# Patient Record
Sex: MALE | Race: WHITE | NOT HISPANIC OR LATINO | Employment: OTHER | URBAN - METROPOLITAN AREA
[De-identification: names, ages, dates, MRNs, and addresses within clinical notes are randomized per-mention and may not be internally consistent; named-entity substitution may affect disease eponyms.]

---

## 2023-03-23 LAB
ALANINE AMINOTRANSFERASE (SGPT) (U/L) IN SER/PLAS: 26 U/L (ref 10–52)
ALBUMIN (G/DL) IN SER/PLAS: 4.5 G/DL (ref 3.4–5)
ALKALINE PHOSPHATASE (U/L) IN SER/PLAS: 68 U/L (ref 33–136)
ANION GAP IN SER/PLAS: 12 MMOL/L (ref 10–20)
ASPARTATE AMINOTRANSFERASE (SGOT) (U/L) IN SER/PLAS: 22 U/L (ref 9–39)
BILIRUBIN TOTAL (MG/DL) IN SER/PLAS: 0.6 MG/DL (ref 0–1.2)
CALCIUM (MG/DL) IN SER/PLAS: 9.9 MG/DL (ref 8.6–10.6)
CARBON DIOXIDE, TOTAL (MMOL/L) IN SER/PLAS: 31 MMOL/L (ref 21–32)
CHLORIDE (MMOL/L) IN SER/PLAS: 106 MMOL/L (ref 98–107)
CHOLESTEROL (MG/DL) IN SER/PLAS: 132 MG/DL (ref 0–199)
CHOLESTEROL IN HDL (MG/DL) IN SER/PLAS: 61.5 MG/DL
CHOLESTEROL/HDL RATIO: 2.1
CREATININE (MG/DL) IN SER/PLAS: 0.94 MG/DL (ref 0.5–1.3)
ERYTHROCYTE DISTRIBUTION WIDTH (RATIO) BY AUTOMATED COUNT: 12.5 % (ref 11.5–14.5)
ERYTHROCYTE MEAN CORPUSCULAR HEMOGLOBIN CONCENTRATION (G/DL) BY AUTOMATED: 32.7 G/DL (ref 32–36)
ERYTHROCYTE MEAN CORPUSCULAR VOLUME (FL) BY AUTOMATED COUNT: 100 FL (ref 80–100)
ERYTHROCYTES (10*6/UL) IN BLOOD BY AUTOMATED COUNT: 4.61 X10E12/L (ref 4.5–5.9)
GFR MALE: 84 ML/MIN/1.73M2
GLUCOSE (MG/DL) IN SER/PLAS: 93 MG/DL (ref 74–99)
HEMATOCRIT (%) IN BLOOD BY AUTOMATED COUNT: 46.2 % (ref 41–52)
HEMOGLOBIN (G/DL) IN BLOOD: 15.1 G/DL (ref 13.5–17.5)
LDL: 56 MG/DL (ref 0–99)
LEUKOCYTES (10*3/UL) IN BLOOD BY AUTOMATED COUNT: 4.7 X10E9/L (ref 4.4–11.3)
NRBC (PER 100 WBCS) BY AUTOMATED COUNT: 0 /100 WBC (ref 0–0)
PLATELETS (10*3/UL) IN BLOOD AUTOMATED COUNT: 232 X10E9/L (ref 150–450)
POTASSIUM (MMOL/L) IN SER/PLAS: 4.6 MMOL/L (ref 3.5–5.3)
PROSTATE SPECIFIC AG (NG/ML) IN SER/PLAS: <0.1 NG/ML (ref 0–4)
PROTEIN TOTAL: 7 G/DL (ref 6.4–8.2)
SODIUM (MMOL/L) IN SER/PLAS: 144 MMOL/L (ref 136–145)
THYROTROPIN (MIU/L) IN SER/PLAS BY DETECTION LIMIT <= 0.05 MIU/L: 1.03 MIU/L (ref 0.44–3.98)
TRIGLYCERIDE (MG/DL) IN SER/PLAS: 72 MG/DL (ref 0–149)
UREA NITROGEN (MG/DL) IN SER/PLAS: 17 MG/DL (ref 6–23)
VLDL: 14 MG/DL (ref 0–40)

## 2023-06-12 ENCOUNTER — HOSPITAL ENCOUNTER (OUTPATIENT)
Dept: DATA CONVERSION | Facility: HOSPITAL | Age: 78
End: 2023-06-12
Attending: INTERNAL MEDICINE
Payer: MEDICARE

## 2023-06-12 DIAGNOSIS — Z79.82 LONG TERM (CURRENT) USE OF ASPIRIN: ICD-10-CM

## 2023-06-12 DIAGNOSIS — R13.10 DYSPHAGIA, UNSPECIFIED: ICD-10-CM

## 2023-06-12 DIAGNOSIS — K21.9 GASTRO-ESOPHAGEAL REFLUX DISEASE WITHOUT ESOPHAGITIS: ICD-10-CM

## 2023-06-12 DIAGNOSIS — K22.89 OTHER SPECIFIED DISEASE OF ESOPHAGUS: ICD-10-CM

## 2023-06-12 DIAGNOSIS — I45.10 UNSPECIFIED RIGHT BUNDLE-BRANCH BLOCK: ICD-10-CM

## 2023-06-12 DIAGNOSIS — I25.10 ATHEROSCLEROTIC HEART DISEASE OF NATIVE CORONARY ARTERY WITHOUT ANGINA PECTORIS: ICD-10-CM

## 2023-06-12 DIAGNOSIS — I47.10 SUPRAVENTRICULAR TACHYCARDIA (CMS-HCC): ICD-10-CM

## 2023-06-12 DIAGNOSIS — K44.9 DIAPHRAGMATIC HERNIA WITHOUT OBSTRUCTION OR GANGRENE: ICD-10-CM

## 2023-06-12 DIAGNOSIS — Z85.46 PERSONAL HISTORY OF MALIGNANT NEOPLASM OF PROSTATE: ICD-10-CM

## 2023-06-12 DIAGNOSIS — K22.2 ESOPHAGEAL OBSTRUCTION: ICD-10-CM

## 2023-06-12 DIAGNOSIS — Z95.1 PRESENCE OF AORTOCORONARY BYPASS GRAFT: ICD-10-CM

## 2023-06-12 DIAGNOSIS — I10 ESSENTIAL (PRIMARY) HYPERTENSION: ICD-10-CM

## 2023-06-16 ENCOUNTER — HOSPITAL ENCOUNTER (OUTPATIENT)
Dept: DATA CONVERSION | Facility: HOSPITAL | Age: 78
End: 2023-06-16
Attending: PHYSICAL MEDICINE & REHABILITATION
Payer: MEDICARE

## 2023-06-16 DIAGNOSIS — M48.062 SPINAL STENOSIS, LUMBAR REGION WITH NEUROGENIC CLAUDICATION: ICD-10-CM

## 2023-09-07 VITALS
HEART RATE: 92 BPM | TEMPERATURE: 98.2 F | RESPIRATION RATE: 16 BRPM | DIASTOLIC BLOOD PRESSURE: 72 MMHG | SYSTOLIC BLOOD PRESSURE: 145 MMHG

## 2023-09-20 ENCOUNTER — HOSPITAL ENCOUNTER (OUTPATIENT)
Dept: DATA CONVERSION | Facility: HOSPITAL | Age: 78
End: 2023-09-20
Attending: PHYSICAL MEDICINE & REHABILITATION | Admitting: PHYSICAL MEDICINE & REHABILITATION
Payer: MEDICARE

## 2023-09-20 DIAGNOSIS — M48.062 SPINAL STENOSIS, LUMBAR REGION WITH NEUROGENIC CLAUDICATION: ICD-10-CM

## 2023-09-29 VITALS
RESPIRATION RATE: 16 BRPM | SYSTOLIC BLOOD PRESSURE: 165 MMHG | DIASTOLIC BLOOD PRESSURE: 79 MMHG | HEART RATE: 80 BPM | TEMPERATURE: 97.5 F

## 2023-09-30 NOTE — H&P
History of Present Illness:   History Present Illness:  Reason for surgery: Lumbar stenosis with neurogenic  claudication   HPI:    Patient with lumbar stenosis with neurogenic claudication here for repeat L5-S1 ammeter RALEIGH.    Allergies:        Allergies:  ·  beta blockers : Anaphylaxis    Home Medication Review:   Home Medications Reviewed: yes     Impression/Procedure:   ·  Impression and Planned Procedure: Stenosis with neurogenic [id number] interlaminar RALEIGH       ERAS (Enhanced Recovery After Surgery):  ·  ERAS Patient: no       Vital Signs:  Temperature C: 36.4 degrees C   Temperature F: 97.5 degrees F   Heart Rate: 80 beats per minute   Respiratory Rate: 16 breath per minute   Blood Pressure Systolic: 165 mm/Hg   Blood Pressure Diastolic: 79 mm/Hg     Physical Exam by System:    Constitutional: Well developed, awake/alert/oriented  x3, no distress, alert and cooperative   Eyes: PERRL, EOMI, clear sclera   ENMT: mucous membranes moist, no apparent injury,  no lesions seen   Head/Neck: Neck supple, no apparent injury, thyroid  without mass or tenderness, No JVD, trachea midline, no bruits   Respiratory/Thorax: unlabored breathing   Cardiovascular: no edema   Skin: Warm and dry, no lesions, no rashes     Consent:   COVID-19 Consent:  ·  COVID-19 Risk Consent Surgeon has reviewed key risks related to the risk of jorge COVID-19 and if they contract COVID-19 what the risks are.       Electronic Signatures:  Darryl Neely)  (Signed 20-Sep-2023 09:50)   Authored: History of Present Illness, Allergies, Home  Medication Review, Impression/Procedure, ERAS, Physical Exam, Consent, Note Completion      Last Updated: 20-Sep-2023 09:50 by Darryl Neely)

## 2023-09-30 NOTE — H&P
History of Present Illness:   History Present Illness:  Reason for surgery: lumbar stenosis   HPI:    lumbar stenosis    Allergies:        Allergies:  ·  beta blockers : Anaphylaxis    Home Medication Review:   Home Medications Reviewed: yes     Impression/Procedure:   ·  Impression and Planned Procedure: lumbar stenosis -> l5/s1 interlaminar epidural steroid injection       ERAS (Enhanced Recovery After Surgery):  ·  ERAS Patient: no       Vital Signs:  Temperature C: 36.8 degrees C   Temperature F: 98.2 degrees F   Heart Rate: 92 beats per minute   Respiratory Rate: 16 breath per minute   Blood Pressure Systolic: 145 mm/Hg   Blood Pressure Diastolic: 72 mm/Hg     Physical Exam by System:    Respiratory/Thorax: Nonlabored breathing, symmetric  chest rise   Cardiovascular: Pulse regular     Consent:   COVID-19 Consent:  ·  COVID-19 Risk Consent Surgeon has reviewed key risks related to the risk of jorge COVID-19 and if they contract COVID-19 what the risks are.     Attestation:   Note Completion:  I am a:  Resident/Fellow   Attending Attestation I saw and evaluated the patient.  I personally obtained the key and critical portions of the history and physical exam or was physically present for key and  critical portions performed by the resident/fellow. I reviewed the resident/fellow?s documentation and discussed the patient with the resident/fellow.  I agree with the resident/fellow?s medical decision making as documented in the note.     I personally evaluated the patient on 16-Jun-2023         Electronic Signatures:  Roberto Garcia (Resident))  (Signed 16-Jun-2023 13:38)   Authored: History of Present Illness, Allergies, Home  Medication Review, Impression/Procedure, ERAS, Physical Exam, Consent, Note Completion  Darryl Neely)  (Signed 16-Jun-2023 13:55)   Authored: Note Completion   Co-Signer: History of Present Illness, Allergies, Home Medication Review, Impression/Procedure, ERAS, Physical  Exam, Consent, Note Completion      Last Updated: 16-Jun-2023 13:55 by Darryl Neely)

## 2023-10-01 NOTE — OP NOTE
Post Operative Note:     PreOp Diagnosis: Lumbar stenosis with neurogenic  claudication   Post-Procedure Diagnosis: Lumbar stenosis with neurogenic  claudication   Procedure: 1.  L5-S1 interlaminar  2.   3.   4.   5.   Surgeon: Darryl Neely MD   Resident/Fellow/Other Assistant: Urbano Davis DO   Estimated Blood Loss (mL): none   Specimen: no   Complications: none apparent   Findings: expected anatomy     Operative Report Dictated:  Dictation: not applicable - note contains Operative  Report   Operative Report:    After informed consent was obtained, the patient was brought to the operating room and placed in the prone position.  The back area was prepped and draped in usual  sterile fashion.  Using fluoroscopic guidance, the skin and subcutaneous tissue overlying needle trajectory to the below interlaminar epidural space were anesthetized with a total of 5 mL of 0.5% lidocaine in the below paraspinous approach.  An 18-gauge  Tuohy needle was then advanced under fluoroscopic guidance with the below paraspinous approach into the below  interlaminar epidural space. Entry of the epidural space was confirmed using loss of resistance technique with a glass syringe and 2 mL of air.   Injection of Iohexol contrast revealed appropriate spread without vascular uptake. Subsequently, the below medications were injected.  The needle was removed.  The patient tolerated the procedure well.  The patient was then transferred to recovery room  in stable condition. The patient will participate in physical therapy, update us on his/her response, follow up with us on outpatient basis as needed.    Level: L5-S1    Medications [4 mL of 0.5% lidocaine and 40 mg methylprednisolone]    Attestation:   Note Completion:  Attending Attestation I was present for the entire procedure         Electronic Signatures:  Darryl Neely (MD)  (Signed 20-Sep-2023 10:39)   Authored: Post Operative Note, Note Completion      Last Updated:  20-Sep-2023 10:39 by Darryl Neely)

## 2023-10-02 NOTE — OP NOTE
Post Operative Note:     PreOp Diagnosis: Lumbar stenosis with neurogenic  claudication   Post-Procedure Diagnosis: Lumbar stenosis with neurogenic  claudication   Procedure: 1.  L5-S1 interlaminar RALEIGH  2.   3.   4.   5.   Surgeon: Darryl Neely MD   Resident/Fellow/Other Assistant: Roberto Garcia DO   Estimated Blood Loss (mL): none   Specimen: no   Complications: none apparent   Findings: expected anatomy     Operative Report Dictated:  Dictation: not applicable - note contains Operative  Report   Operative Report:    After informed consent was obtained, the patient was brought to the operating room and placed in the prone position.  The back area was prepped and draped in usual  sterile fashion.  Using fluoroscopic guidance, the skin and subcutaneous tissue overlying needle trajectory to the below interlaminar epidural space were anesthetized with a total of 5 mL of 0.5% lidocaine in the below paraspinous approach.  An 18-gauge  Tuohy needle was then advanced under fluoroscopic guidance with the below paraspinous approach into the below  interlaminar epidural space. Entry of the epidural space was confirmed using loss of resistance technique with a glass syringe and 2 mL of air.   Injection of Iohexol contrast revealed appropriate spread without vascular uptake. Subsequently, the below medications were injected.  The needle was removed.  The patient tolerated the procedure well.  The patient was then transferred to recovery room  in stable condition. The patient will participate in physical therapy, update us on his/her response, follow up with us on outpatient basis as needed.    Level: L5-S1    Medications [4 mL of 0.5% lidocaine and 40 mg methylprednisolone]    Attestation:   Note Completion:  Attending Attestation I was present for the entire procedure         Electronic Signatures:  Darryl Neely (MD)  (Signed 16-Jun-2023 14:38)   Authored: Post Operative Note, Note Completion      Last Updated:  16-Jun-2023 14:38 by Darryl Neely)

## 2023-10-09 PROBLEM — I49.9 ARRHYTHMIA: Status: ACTIVE | Noted: 2023-10-09

## 2023-10-09 PROBLEM — M48.061 DEGENERATIVE LUMBAR SPINAL STENOSIS: Status: ACTIVE | Noted: 2023-10-09

## 2023-10-09 PROBLEM — K21.9 HIATAL HERNIA WITH GERD: Status: ACTIVE | Noted: 2023-10-09

## 2023-10-09 PROBLEM — D22.39 MELANOCYTIC NEVI OF OTHER PARTS OF FACE: Status: ACTIVE | Noted: 2019-06-05

## 2023-10-09 PROBLEM — D22.5 MELANOCYTIC NEVI OF TRUNK: Status: ACTIVE | Noted: 2019-06-05

## 2023-10-09 PROBLEM — L90.5 SCAR CONDITION AND FIBROSIS OF SKIN: Status: ACTIVE | Noted: 2019-06-05

## 2023-10-09 PROBLEM — E55.9 VITAMIN D DEFICIENCY, UNSPECIFIED: Status: ACTIVE | Noted: 2023-10-09

## 2023-10-09 PROBLEM — I25.10 CORONARY ARTERY DISEASE: Status: ACTIVE | Noted: 2023-10-09

## 2023-10-09 PROBLEM — M54.10 RADICULAR SYNDROME OF RIGHT LEG: Status: ACTIVE | Noted: 2023-10-09

## 2023-10-09 PROBLEM — E78.5 HYPERLIPIDEMIA: Status: ACTIVE | Noted: 2023-10-09

## 2023-10-09 PROBLEM — L57.9 SKIN CHANGES DUE TO CHRONIC EXPOSURE TO NONIONIZING RADIATION, UNSPECIFIED: Status: ACTIVE | Noted: 2019-06-05

## 2023-10-09 PROBLEM — I45.10 RIGHT BUNDLE BRANCH BLOCK (RBBB) ON ELECTROCARDIOGRAPHY: Status: ACTIVE | Noted: 2023-10-09

## 2023-10-09 PROBLEM — N52.9 MALE ERECTILE DISORDER: Status: ACTIVE | Noted: 2023-10-09

## 2023-10-09 PROBLEM — F32.A ANXIETY AND DEPRESSION: Status: ACTIVE | Noted: 2023-10-09

## 2023-10-09 PROBLEM — D48.5 NEOPLASM OF UNCERTAIN BEHAVIOR OF SKIN: Status: ACTIVE | Noted: 2019-06-05

## 2023-10-09 PROBLEM — M54.50 LOW BACK PAIN: Status: ACTIVE | Noted: 2023-10-09

## 2023-10-09 PROBLEM — L82.1 OTHER SEBORRHEIC KERATOSIS: Status: ACTIVE | Noted: 2019-06-05

## 2023-10-09 PROBLEM — D18.01 HEMANGIOMA OF SKIN AND SUBCUTANEOUS TISSUE: Status: ACTIVE | Noted: 2019-06-05

## 2023-10-09 PROBLEM — K44.9 HIATAL HERNIA WITH GERD: Status: ACTIVE | Noted: 2023-10-09

## 2023-10-09 PROBLEM — L81.4 OTHER MELANIN HYPERPIGMENTATION: Status: ACTIVE | Noted: 2019-06-05

## 2023-10-09 PROBLEM — R94.31 ABNORMAL ELECTROCARDIOGRAM: Status: ACTIVE | Noted: 2023-10-09

## 2023-10-09 PROBLEM — J30.9 ALLERGIC RHINITIS: Status: ACTIVE | Noted: 2023-10-09

## 2023-10-09 PROBLEM — D22.9 MULTIPLE NEVI: Status: ACTIVE | Noted: 2023-10-09

## 2023-10-09 PROBLEM — F41.9 ANXIETY AND DEPRESSION: Status: ACTIVE | Noted: 2023-10-09

## 2023-10-09 PROBLEM — L57.0 ACTINIC KERATOSIS: Status: ACTIVE | Noted: 2019-06-05

## 2023-10-09 PROBLEM — K22.2 SCHATZKI'S RING: Status: ACTIVE | Noted: 2023-10-09

## 2023-10-09 PROBLEM — C61 PROSTATE CANCER (MULTI): Status: ACTIVE | Noted: 2023-10-09

## 2023-10-09 PROBLEM — D22.70 MELANOCYTIC NEVI OF UNSPECIFIED LOWER LIMB, INCLUDING HIP: Status: ACTIVE | Noted: 2019-06-05

## 2023-10-09 PROBLEM — D75.89 MACROCYTOSIS WITHOUT ANEMIA: Status: ACTIVE | Noted: 2023-10-09

## 2023-10-09 PROBLEM — I10 HYPERTENSION: Status: ACTIVE | Noted: 2023-10-09

## 2023-10-09 PROBLEM — D22.60 MELANOCYTIC NEVI OF UNSPECIFIED UPPER LIMB, INCLUDING SHOULDER: Status: ACTIVE | Noted: 2019-06-05

## 2023-10-09 RX ORDER — HYDROGEN PEROXIDE 3 %
20 SOLUTION, NON-ORAL MISCELLANEOUS
COMMUNITY
Start: 2023-08-16 | End: 2024-05-20 | Stop reason: SDUPTHER

## 2023-10-09 RX ORDER — FUROSEMIDE 20 MG/1
10 TABLET ORAL DAILY
COMMUNITY
Start: 2023-08-02

## 2023-10-09 RX ORDER — FLUTICASONE PROPIONATE 50 MCG
1 SPRAY, SUSPENSION (ML) NASAL DAILY
COMMUNITY
Start: 2022-03-24 | End: 2024-03-21 | Stop reason: SDUPTHER

## 2023-10-09 RX ORDER — ATORVASTATIN CALCIUM 40 MG/1
1 TABLET, FILM COATED ORAL DAILY
COMMUNITY
Start: 2015-02-19 | End: 2024-05-20 | Stop reason: SDUPTHER

## 2023-10-09 RX ORDER — ALBUTEROL SULFATE 90 UG/1
2 AEROSOL, METERED RESPIRATORY (INHALATION) EVERY 4 HOURS PRN
COMMUNITY
Start: 2022-12-30

## 2023-10-09 RX ORDER — OLMESARTAN MEDOXOMIL 40 MG/1
40 TABLET ORAL DAILY
COMMUNITY
Start: 2020-01-30 | End: 2024-05-20 | Stop reason: SDUPTHER

## 2023-10-09 RX ORDER — EZETIMIBE 10 MG/1
1 TABLET ORAL DAILY
COMMUNITY
Start: 2022-04-05 | End: 2024-05-20 | Stop reason: SDUPTHER

## 2023-10-09 RX ORDER — NIRMATRELVIR AND RITONAVIR 300-100 MG
KIT ORAL
COMMUNITY
Start: 2022-11-23 | End: 2023-10-11 | Stop reason: ALTCHOICE

## 2023-10-09 RX ORDER — DILTIAZEM HYDROCHLORIDE 300 MG/1
300 CAPSULE, COATED, EXTENDED RELEASE ORAL DAILY
COMMUNITY
Start: 2023-01-11 | End: 2024-05-20 | Stop reason: SDUPTHER

## 2023-10-09 RX ORDER — FLUTICASONE PROPIONATE AND SALMETEROL 250; 50 UG/1; UG/1
POWDER RESPIRATORY (INHALATION)
COMMUNITY
Start: 2023-03-28

## 2023-10-11 ENCOUNTER — OFFICE VISIT (OUTPATIENT)
Dept: CARDIOLOGY | Facility: CLINIC | Age: 78
End: 2023-10-11
Payer: MEDICARE

## 2023-10-11 VITALS
HEIGHT: 70 IN | HEART RATE: 84 BPM | WEIGHT: 192.19 LBS | OXYGEN SATURATION: 96 % | DIASTOLIC BLOOD PRESSURE: 74 MMHG | BODY MASS INDEX: 27.52 KG/M2 | SYSTOLIC BLOOD PRESSURE: 126 MMHG

## 2023-10-11 DIAGNOSIS — I10 HYPERTENSION, UNSPECIFIED TYPE: ICD-10-CM

## 2023-10-11 DIAGNOSIS — E78.5 HYPERLIPIDEMIA, UNSPECIFIED HYPERLIPIDEMIA TYPE: ICD-10-CM

## 2023-10-11 DIAGNOSIS — I25.10 CORONARY ARTERY DISEASE INVOLVING NATIVE CORONARY ARTERY OF NATIVE HEART WITHOUT ANGINA PECTORIS: Primary | ICD-10-CM

## 2023-10-11 PROCEDURE — 1126F AMNT PAIN NOTED NONE PRSNT: CPT | Performed by: NURSE PRACTITIONER

## 2023-10-11 PROCEDURE — 99214 OFFICE O/P EST MOD 30 MIN: CPT | Mod: PO | Performed by: NURSE PRACTITIONER

## 2023-10-11 PROCEDURE — 1159F MED LIST DOCD IN RCRD: CPT | Performed by: NURSE PRACTITIONER

## 2023-10-11 PROCEDURE — 3078F DIAST BP <80 MM HG: CPT | Performed by: NURSE PRACTITIONER

## 2023-10-11 PROCEDURE — 99214 OFFICE O/P EST MOD 30 MIN: CPT | Performed by: NURSE PRACTITIONER

## 2023-10-11 PROCEDURE — 1160F RVW MEDS BY RX/DR IN RCRD: CPT | Performed by: NURSE PRACTITIONER

## 2023-10-11 PROCEDURE — 1036F TOBACCO NON-USER: CPT | Performed by: NURSE PRACTITIONER

## 2023-10-11 PROCEDURE — 3074F SYST BP LT 130 MM HG: CPT | Performed by: NURSE PRACTITIONER

## 2023-10-11 RX ORDER — MULTIVITAMIN
1 TABLET ORAL
COMMUNITY
Start: 2012-09-06

## 2023-10-11 ASSESSMENT — ENCOUNTER SYMPTOMS
DEPRESSION: 0
LOSS OF SENSATION IN FEET: 0
OCCASIONAL FEELINGS OF UNSTEADINESS: 0

## 2023-10-11 ASSESSMENT — PAIN SCALES - GENERAL: PAINLEVEL: 0-NO PAIN

## 2023-10-11 NOTE — PROGRESS NOTES
Subjective   Neo Mckee is a 77 y.o. male.    Chief Complaint:  Routine 6 month follow up     Mr. Mckee returns for a 6 month follow up. He has been feeling well from a cardiac standpoint. He has remained compliant with his medications, denying any intolerances. He seems to be getting around reasonably well, denying any exertional chest pain or shortness of breath. His biggest limiting factor continues to be back pain. He denies any recent ER visits or hospitalizations. He offers no specific cardiovascular complaints or concerns today. He denies any complaints of chest pain, shortness of breath, lightheadedness, dizziness, palpitations, syncope, orthopnea, paroxysmal nocturnal dyspnea, lower extremity swelling or bleeding concerns.        Review of Systems   All other systems reviewed and are negative.      Objective   Physical Exam  Constitutional:       Appearance: Healthy appearance.   Pulmonary:      Effort: Pulmonary effort is normal.      Breath sounds: Normal breath sounds.   Cardiovascular:      Normal rate. Regular rhythm. Normal S1. Normal S2.       Murmurs: There is no murmur.   Edema:     Peripheral edema absent.   Abdominal:      Palpations: Abdomen is soft.   Musculoskeletal: Normal range of motion.      Cervical back: Normal range of motion. Skin:     General: Skin is warm and dry.   Neurological:      Mental Status: Alert and oriented to person, place and time.       Lab Review:   Lab Results   Component Value Date     03/23/2023    K 4.6 03/23/2023     03/23/2023    CO2 31 03/23/2023    BUN 17 03/23/2023    CREATININE 0.94 03/23/2023    GLUCOSE 93 03/23/2023    CALCIUM 9.9 03/23/2023     Lab Results   Component Value Date    WBC 4.7 03/23/2023    HGB 15.1 03/23/2023    HCT 46.2 03/23/2023     03/23/2023     03/23/2023     Lab Results   Component Value Date    CHOL 132 03/23/2023    TRIG 72 03/23/2023    HDL 61.5 03/23/2023       Assessment/Plan   Mr. Mckee is a pleasant  77 year old  male with a past medical history significant for hypertension, hyperlipidemia and coronary artery disease status post remote 2 vessel CABG in 1998. Stress-echo in 2018 showed no evidence of ischemia and preserved LV function. Echocardiogram 8/2021 showed an EF of 55-60% with mild MR. He presents today for routine follow up stable from a cardiac standpoint. His VS and EKG remain stable. He remains on appropriate antiplatelet and lipid lowering therapy with his LDL at goal. He struggles with back pain, though denies any chest pain or shortness of breath at his current activity level. I will have him continue all medications unchanged. He will follow up with us in clinic in 6 months. He knows to call for any concerns.

## 2023-10-11 NOTE — PATIENT INSTRUCTIONS
Follow up in 6 months     Call for any concerns    Consent: The risks of the medication were reviewed with the patient.

## 2023-11-06 RX ORDER — FLUTICASONE PROPIONATE 50 MCG
SPRAY, SUSPENSION (ML) NASAL
Qty: 32 G | Refills: 10 | OUTPATIENT
Start: 2023-11-06

## 2023-11-08 DIAGNOSIS — M48.062 SPINAL STENOSIS OF LUMBAR REGION WITH NEUROGENIC CLAUDICATION: Primary | ICD-10-CM

## 2024-01-03 ENCOUNTER — HOSPITAL ENCOUNTER (OUTPATIENT)
Dept: OPERATING ROOM | Facility: CLINIC | Age: 79
Setting detail: OUTPATIENT SURGERY
Discharge: HOME | End: 2024-01-03
Payer: MEDICARE

## 2024-01-03 ENCOUNTER — ANCILLARY PROCEDURE (OUTPATIENT)
Dept: RADIOLOGY | Facility: CLINIC | Age: 79
End: 2024-01-03
Payer: MEDICARE

## 2024-01-03 VITALS
HEIGHT: 70 IN | WEIGHT: 198.85 LBS | HEART RATE: 94 BPM | TEMPERATURE: 97.9 F | DIASTOLIC BLOOD PRESSURE: 99 MMHG | RESPIRATION RATE: 16 BRPM | OXYGEN SATURATION: 96 % | SYSTOLIC BLOOD PRESSURE: 173 MMHG | BODY MASS INDEX: 28.47 KG/M2

## 2024-01-03 DIAGNOSIS — M48.062 SPINAL STENOSIS OF LUMBAR REGION WITH NEUROGENIC CLAUDICATION: ICD-10-CM

## 2024-01-03 PROCEDURE — 77003 FLUOROGUIDE FOR SPINE INJECT: CPT | Mod: RSC

## 2024-01-03 PROCEDURE — 62323 NJX INTERLAMINAR LMBR/SAC: CPT | Performed by: PHYSICAL MEDICINE & REHABILITATION

## 2024-01-03 PROCEDURE — 2500000004 HC RX 250 GENERAL PHARMACY W/ HCPCS (ALT 636 FOR OP/ED): Performed by: PHYSICAL MEDICINE & REHABILITATION

## 2024-01-03 PROCEDURE — 2550000001 HC RX 255 CONTRASTS: Performed by: PHYSICAL MEDICINE & REHABILITATION

## 2024-01-03 PROCEDURE — 2500000005 HC RX 250 GENERAL PHARMACY W/O HCPCS: Performed by: PHYSICAL MEDICINE & REHABILITATION

## 2024-01-03 RX ORDER — METHYLPREDNISOLONE ACETATE 40 MG/ML
INJECTION, SUSPENSION INTRA-ARTICULAR; INTRALESIONAL; INTRAMUSCULAR; SOFT TISSUE AS NEEDED
Status: COMPLETED | OUTPATIENT
Start: 2024-01-03 | End: 2024-01-03

## 2024-01-03 RX ORDER — LIDOCAINE HYDROCHLORIDE 5 MG/ML
INJECTION, SOLUTION INFILTRATION; PERINEURAL AS NEEDED
Status: COMPLETED | OUTPATIENT
Start: 2024-01-03 | End: 2024-01-03

## 2024-01-03 RX ADMIN — IOHEXOL 240 MG: 240 INJECTION, SOLUTION INTRATHECAL; INTRAVASCULAR; INTRAVENOUS; ORAL at 11:13

## 2024-01-03 RX ADMIN — LIDOCAINE HYDROCHLORIDE 5 ML: 5 INJECTION, SOLUTION INFILTRATION; PERINEURAL at 11:12

## 2024-01-03 RX ADMIN — METHYLPREDNISOLONE ACETATE 40 MG: 40 INJECTION, SUSPENSION INTRA-ARTICULAR; INTRALESIONAL; INTRAMUSCULAR; SOFT TISSUE at 11:13

## 2024-01-03 ASSESSMENT — PAIN SCALES - GENERAL
PAINLEVEL_OUTOF10: 0 - NO PAIN
PAINLEVEL_OUTOF10: 1

## 2024-01-03 ASSESSMENT — COLUMBIA-SUICIDE SEVERITY RATING SCALE - C-SSRS
6. HAVE YOU EVER DONE ANYTHING, STARTED TO DO ANYTHING, OR PREPARED TO DO ANYTHING TO END YOUR LIFE?: NO
1. IN THE PAST MONTH, HAVE YOU WISHED YOU WERE DEAD OR WISHED YOU COULD GO TO SLEEP AND NOT WAKE UP?: NO
2. HAVE YOU ACTUALLY HAD ANY THOUGHTS OF KILLING YOURSELF?: NO

## 2024-01-03 ASSESSMENT — PAIN DESCRIPTION - DESCRIPTORS: DESCRIPTORS: ACHING

## 2024-01-03 ASSESSMENT — PAIN - FUNCTIONAL ASSESSMENT: PAIN_FUNCTIONAL_ASSESSMENT: 0-10

## 2024-01-03 NOTE — H&P
Patient ID: Patient with lumbar stenosis with neurogenic claudication who presents for the scheduled procedure.  No changes since last visit      Patient denies any recent antibiotic use or infections, denies any blood thinner use, and denies contrast or local anesthetic allergies           GENERAL EXAM  Vital Signs: Vital signs to include heart rate, respiration rate, blood pressure, and temperature were reviewed.  General Appearance:  Awake, alert, healthy appearing, well developed, No acute distress.  Head: Normocephalic without evidence of head injury.  Neck: The appearance of the neck was normal without swelling with a midline trachea.  Eyes: The eyelids and eyebrows exhibited no abnormalities.  Pupils were not pin-point.  Sclera was without icterus.  Lungs: Respiration rhythm and depth was normal.  Respiratory movements were normal without labored breathing.  Cardiovascular: No peripheral edema was present.    Neurological: Patient was oriented to time, place, and person.  Speech was normal.  Balance, gait, and stance were unremarkable.    Psychiatric: Appearance was normal with appropriate dress.  Mood was euthymic and affect was normal.  Skin: Affected regions were without ecchymosis or skin lesions.        Physical exam as above except:        Assessment/Plan    Patient with lumbar stenosis with neurogenic claudication here for L5-S1 interlaminar RALEIGH

## 2024-01-03 NOTE — PROCEDURES
General    Date/Time: 1/3/2024 11:13 AM    Performed by: Darryl Neely MD  Authorized by: Darryl Neely MD    Consent:     Consent obtained:  Written    Consent given by:  Patient    Risks, benefits, and alternatives were discussed: yes      Risks discussed:  Bleeding, infection, pain and nerve damage    Alternatives discussed:  No treatment, delayed treatment and alternative treatment  Universal protocol:     Procedure explained and questions answered to patient or proxy's satisfaction: yes      Relevant documents present and verified: yes      Test results available: yes      Imaging studies available: yes      Required blood products, implants, devices, and special equipment available: yes      Site/side marked: yes      Immediately prior to procedure, a time out was called: yes      Patient identity confirmed:  Verbally with patient, hospital-assigned identification number and arm band  Procedure specific details:      Lumbar interlaminar RALEIGH     After informed consent was obtained, the patient was brought to the operating room and placed in the prone position.  The back area was prepped and draped in usual sterile fashion.  Using fluoroscopic guidance, the skin and subcutaneous tissue overlying needle trajectory to the below interlaminar epidural space were anesthetized with a total of 5 mL of 0.5% lidocaine in the below paraspinous approach.  An 18-gauge Tuohy needle was then advanced under fluoroscopic guidance with the below paraspinous approach into the below  interlaminar epidural space. Entry of the epidural space was confirmed using loss of resistance technique with a glass syringe and 2 mL of air.  Injection of Iohexol contrast revealed appropriate spread without vascular uptake. Subsequently, the below medications were injected.  The needle was removed.  The patient tolerated the procedure well.  The patient was then transferred to recovery room in stable condition. The patient will participate  in physical therapy, update us on his/her response, follow up with us on outpatient basis as needed.    Level: L5-S1  Medications [4 mL of 0.5% lidocaine and 40 mg methylprednisolone]

## 2024-01-04 NOTE — ADDENDUM NOTE
Encounter addended by: Carmen Pineda RN on: 1/4/2024 9:42 AM   Actions taken: Contacts section saved, Flowsheet accepted

## 2024-02-06 ENCOUNTER — APPOINTMENT (OUTPATIENT)
Dept: PRIMARY CARE | Facility: CLINIC | Age: 79
End: 2024-02-06
Payer: MEDICARE

## 2024-02-08 ENCOUNTER — APPOINTMENT (OUTPATIENT)
Dept: PRIMARY CARE | Facility: CLINIC | Age: 79
End: 2024-02-08
Payer: MEDICARE

## 2024-03-20 ENCOUNTER — TELEPHONE (OUTPATIENT)
Dept: PAIN MEDICINE | Facility: CLINIC | Age: 79
End: 2024-03-20
Payer: MEDICARE

## 2024-03-20 DIAGNOSIS — M48.062 SPINAL STENOSIS OF LUMBAR REGION WITH NEUROGENIC CLAUDICATION: Primary | ICD-10-CM

## 2024-03-20 NOTE — TELEPHONE ENCOUNTER
Spoke with patient on the phone. He is interested in a repeat L5-S1 injection as his previous injection from 1/3/24 provided him with 90% pain relief for the past 3 1/2 months. His pain is beginning to worsen and he would like to repeat. I will schedule him for L5-S1 Interlaminar injection 4/3/24.

## 2024-03-21 ENCOUNTER — OFFICE VISIT (OUTPATIENT)
Dept: PRIMARY CARE | Facility: CLINIC | Age: 79
End: 2024-03-21
Payer: MEDICARE

## 2024-03-21 ENCOUNTER — LAB (OUTPATIENT)
Dept: LAB | Facility: LAB | Age: 79
End: 2024-03-21
Payer: MEDICARE

## 2024-03-21 VITALS
TEMPERATURE: 98.1 F | DIASTOLIC BLOOD PRESSURE: 64 MMHG | HEART RATE: 65 BPM | SYSTOLIC BLOOD PRESSURE: 140 MMHG | BODY MASS INDEX: 28.58 KG/M2 | WEIGHT: 199.2 LBS

## 2024-03-21 DIAGNOSIS — M54.10 RADICULAR SYNDROME OF RIGHT LEG: ICD-10-CM

## 2024-03-21 DIAGNOSIS — J45.20 MILD INTERMITTENT EXTRINSIC ASTHMA WITHOUT COMPLICATION (HHS-HCC): ICD-10-CM

## 2024-03-21 DIAGNOSIS — Z86.010 PERSONAL HISTORY OF COLONIC POLYPS: ICD-10-CM

## 2024-03-21 DIAGNOSIS — I10 HYPERTENSION, UNSPECIFIED TYPE: ICD-10-CM

## 2024-03-21 DIAGNOSIS — H02.403 PTOSIS OF BOTH EYELIDS: ICD-10-CM

## 2024-03-21 DIAGNOSIS — M48.062 SPINAL STENOSIS OF LUMBAR REGION WITH NEUROGENIC CLAUDICATION: ICD-10-CM

## 2024-03-21 DIAGNOSIS — Z00.00 ANNUAL PHYSICAL EXAM: ICD-10-CM

## 2024-03-21 DIAGNOSIS — G89.29 CHRONIC LOW BACK PAIN WITH RIGHT-SIDED SCIATICA, UNSPECIFIED BACK PAIN LATERALITY: ICD-10-CM

## 2024-03-21 DIAGNOSIS — H10.13 ALLERGIC CONJUNCTIVITIS OF BOTH EYES: Primary | ICD-10-CM

## 2024-03-21 DIAGNOSIS — I25.10 CORONARY ARTERY DISEASE INVOLVING NATIVE CORONARY ARTERY OF NATIVE HEART WITHOUT ANGINA PECTORIS: ICD-10-CM

## 2024-03-21 DIAGNOSIS — J30.9 ALLERGIC RHINITIS, UNSPECIFIED SEASONALITY, UNSPECIFIED TRIGGER: ICD-10-CM

## 2024-03-21 DIAGNOSIS — K22.2 SCHATZKI'S RING: ICD-10-CM

## 2024-03-21 DIAGNOSIS — C61 PROSTATE CANCER (MULTI): ICD-10-CM

## 2024-03-21 DIAGNOSIS — E78.5 HYPERLIPIDEMIA, UNSPECIFIED HYPERLIPIDEMIA TYPE: ICD-10-CM

## 2024-03-21 DIAGNOSIS — I99.8 VASCULAR INSUFFICIENCY: ICD-10-CM

## 2024-03-21 DIAGNOSIS — M54.41 CHRONIC LOW BACK PAIN WITH RIGHT-SIDED SCIATICA, UNSPECIFIED BACK PAIN LATERALITY: ICD-10-CM

## 2024-03-21 PROBLEM — K21.9 GASTROESOPHAGEAL REFLUX DISEASE WITH HIATAL HERNIA: Status: ACTIVE | Noted: 2023-10-09

## 2024-03-21 PROBLEM — R13.10 DYSPHAGIA, UNSPECIFIED TYPE: Status: ACTIVE | Noted: 2023-06-12

## 2024-03-21 PROBLEM — F41.9 ANXIETY AND DEPRESSION: Status: RESOLVED | Noted: 2023-10-09 | Resolved: 2024-03-21

## 2024-03-21 PROBLEM — R05.3 PERSISTENT COUGH: Status: ACTIVE | Noted: 2024-03-21

## 2024-03-21 PROBLEM — K44.9 DIAPHRAGMATIC HERNIA: Status: ACTIVE | Noted: 2023-06-12

## 2024-03-21 PROBLEM — N52.9 IMPOTENCE: Status: ACTIVE | Noted: 2024-03-21

## 2024-03-21 PROBLEM — M46.1 INFLAMMATION OF SACROILIAC JOINT (CMS-HCC): Status: ACTIVE | Noted: 2022-10-10

## 2024-03-21 PROBLEM — Z86.0100 PERSONAL HISTORY OF COLONIC POLYPS: Status: ACTIVE | Noted: 2024-03-21

## 2024-03-21 PROBLEM — K44.9 GASTROESOPHAGEAL REFLUX DISEASE WITH HIATAL HERNIA: Status: ACTIVE | Noted: 2023-10-09

## 2024-03-21 PROBLEM — Z95.1 PRESENCE OF AORTOCORONARY BYPASS GRAFT: Status: ACTIVE | Noted: 2023-06-12

## 2024-03-21 PROBLEM — F32.A ANXIETY AND DEPRESSION: Status: RESOLVED | Noted: 2023-10-09 | Resolved: 2024-03-21

## 2024-03-21 PROBLEM — Z85.46 HISTORY OF MALIGNANT NEOPLASM OF PROSTATE: Status: ACTIVE | Noted: 2023-06-12

## 2024-03-21 PROBLEM — H02.409 PTOSIS OF EYELID: Status: ACTIVE | Noted: 2024-03-21

## 2024-03-21 LAB
ALBUMIN SERPL BCP-MCNC: 4.5 G/DL (ref 3.4–5)
ALP SERPL-CCNC: 70 U/L (ref 33–136)
ALT SERPL W P-5'-P-CCNC: 19 U/L (ref 10–52)
ANION GAP SERPL CALC-SCNC: 15 MMOL/L (ref 10–20)
AST SERPL W P-5'-P-CCNC: 17 U/L (ref 9–39)
BILIRUB SERPL-MCNC: 1.1 MG/DL (ref 0–1.2)
BUN SERPL-MCNC: 15 MG/DL (ref 6–23)
CALCIUM SERPL-MCNC: 9.6 MG/DL (ref 8.6–10.6)
CHLORIDE SERPL-SCNC: 101 MMOL/L (ref 98–107)
CHOLEST SERPL-MCNC: 134 MG/DL (ref 0–199)
CHOLESTEROL/HDL RATIO: 2.3
CO2 SERPL-SCNC: 28 MMOL/L (ref 21–32)
CREAT SERPL-MCNC: 0.87 MG/DL (ref 0.5–1.3)
EGFRCR SERPLBLD CKD-EPI 2021: 89 ML/MIN/1.73M*2
ERYTHROCYTE [DISTWIDTH] IN BLOOD BY AUTOMATED COUNT: 12.5 % (ref 11.5–14.5)
GLUCOSE SERPL-MCNC: 100 MG/DL (ref 74–99)
HCT VFR BLD AUTO: 46.6 % (ref 41–52)
HDLC SERPL-MCNC: 57.9 MG/DL
HGB BLD-MCNC: 15.7 G/DL (ref 13.5–17.5)
LDLC SERPL CALC-MCNC: 61 MG/DL
MCH RBC QN AUTO: 33.3 PG (ref 26–34)
MCHC RBC AUTO-ENTMCNC: 33.7 G/DL (ref 32–36)
MCV RBC AUTO: 99 FL (ref 80–100)
NON HDL CHOLESTEROL: 76 MG/DL (ref 0–149)
NRBC BLD-RTO: 0 /100 WBCS (ref 0–0)
PLATELET # BLD AUTO: 211 X10*3/UL (ref 150–450)
POTASSIUM SERPL-SCNC: 4.1 MMOL/L (ref 3.5–5.3)
PROT SERPL-MCNC: 7.1 G/DL (ref 6.4–8.2)
PSA SERPL-MCNC: <0.1 NG/ML
RBC # BLD AUTO: 4.72 X10*6/UL (ref 4.5–5.9)
SODIUM SERPL-SCNC: 140 MMOL/L (ref 136–145)
TRIGL SERPL-MCNC: 76 MG/DL (ref 0–149)
VLDL: 15 MG/DL (ref 0–40)
WBC # BLD AUTO: 5.5 X10*3/UL (ref 4.4–11.3)

## 2024-03-21 PROCEDURE — 1036F TOBACCO NON-USER: CPT | Performed by: INTERNAL MEDICINE

## 2024-03-21 PROCEDURE — 3078F DIAST BP <80 MM HG: CPT | Performed by: INTERNAL MEDICINE

## 2024-03-21 PROCEDURE — 84153 ASSAY OF PSA TOTAL: CPT

## 2024-03-21 PROCEDURE — G0439 PPPS, SUBSEQ VISIT: HCPCS | Performed by: INTERNAL MEDICINE

## 2024-03-21 PROCEDURE — 85027 COMPLETE CBC AUTOMATED: CPT

## 2024-03-21 PROCEDURE — 3077F SYST BP >= 140 MM HG: CPT | Performed by: INTERNAL MEDICINE

## 2024-03-21 PROCEDURE — 80061 LIPID PANEL: CPT

## 2024-03-21 PROCEDURE — 36415 COLL VENOUS BLD VENIPUNCTURE: CPT

## 2024-03-21 PROCEDURE — 80053 COMPREHEN METABOLIC PANEL: CPT

## 2024-03-21 PROCEDURE — 1159F MED LIST DOCD IN RCRD: CPT | Performed by: INTERNAL MEDICINE

## 2024-03-21 RX ORDER — OLOPATADINE HYDROCHLORIDE 2 MG/ML
1 SOLUTION/ DROPS OPHTHALMIC DAILY
Qty: 2.5 ML | Refills: 3 | Status: SHIPPED | OUTPATIENT
Start: 2024-03-21 | End: 2024-04-20

## 2024-03-21 RX ORDER — FLUTICASONE PROPIONATE 50 MCG
1 SPRAY, SUSPENSION (ML) NASAL DAILY
Qty: 16 G | Refills: 11 | Status: SHIPPED | OUTPATIENT
Start: 2024-03-21 | End: 2024-04-20

## 2024-03-21 RX ORDER — MONTELUKAST SODIUM 10 MG/1
10 TABLET ORAL NIGHTLY
Qty: 30 TABLET | Refills: 11 | Status: SHIPPED | OUTPATIENT
Start: 2024-03-21 | End: 2024-05-08 | Stop reason: WASHOUT

## 2024-03-21 NOTE — PROGRESS NOTES
Subjective   Patient ID:   [id number]   [id number]   Neo Mckee is a 78 y.o. male who presents for Follow-up (physical).  HPI    78 year old male here for annual exam.  He notes he was in [place] for 5 weeks.  One hour after he got off the plane he started to sniffle and his allergies presented.  He does not experience allergies in [place].  He did have some symptoms after her flew.  He did start allegra.  His eye itch.  His grandsons are 17 years old.  Severo is on the wrestling team and Eduar is on cross country team and works out constantly.  He took health  certification course at [facility] [employer].  They are both at [employer].  He had an epidural injection of his back by pain doctor, Dr. Diop.  He has velcro support band on.  If he stands up too fast he feels right thigh pain.  The injection decreases the radicular pain.  He still goes to his chiropracter and he still works out and stretches.  He checks his BP at home and runs 128-132/70-80 on average.  He denies chest pain or SoB.  His BeActive+ brace helped to stop the right thigh pain so he wears regularly.    ROS were reviewed and are negative with the exception of what is noted in HPI    /73 (BP Location: Right arm, Patient Position: Sitting, BP Cuff Size: Adult)   Pulse 65   Temp 36.7 °C (98.1 °F) (Temporal)   Wt 90.4 kg (199 lb 3.2 oz)   BMI 28.58 kg/m²   Objective   Physical Exam  Constitutional:       General: He is not in acute distress.  HENT:      Head: Normocephalic and atraumatic.      Right Ear: Tympanic membrane normal.      Left Ear: Tympanic membrane normal.      Nose:      Comments: Nasal turbinate full bilaterally,     Mouth/Throat:      Mouth: Mucous membranes are moist.      Pharynx: No oropharyngeal exudate or posterior oropharyngeal erythema.   Eyes:      Extraocular Movements: Extraocular movements intact.      Pupils: Pupils are equal, round, and reactive to light.      Comments: Conjuntivae injected  bilaterally,  upper lids swollen bilaterally, some bilateral ptosis from the swelling   Cardiovascular:      Rate and Rhythm: Normal rate and regular rhythm.      Heart sounds: No murmur heard.     No friction rub. Gallop present.      Comments: +S4  Pulmonary:      Effort: Pulmonary effort is normal.      Breath sounds: No wheezing, rhonchi or rales.   Abdominal:      General: There is no distension.      Palpations: Abdomen is soft.      Tenderness: There is no abdominal tenderness.   Musculoskeletal:      Cervical back: Neck supple.      Comments: Bilateral pitting trace-1+ most prominent at the ankles   Lymphadenopathy:      Cervical: No cervical adenopathy.   Skin:     General: Skin is warm.   Neurological:      Mental Status: He is alert.         Assessment/Plan   Problem List Items Addressed This Visit    None  Provider Impressions     #allergic rhinitis, allergic conjunctivitis, allergic asthma - trial montelukast, pataday eye drops, he is already taking allegra and flonase which are helping, also could add azelastine future if necessary.  Refilled his advair and he also has the albuterol.  To consider future referral to allergist, ENT (Kevin) and PFTs  #. Arthritis, back, leg pain - spinal central canal and foraminal stenosis L2 through L5 on MRI 10/21.  He is thoughtful about his role in the care of his spine and how his diet, exercise impact his pain. He has ongoing care [facility]ropract[facility], [facility] and the pain doctor for epidural steroids. He has lost weight and understands that his weight and diet impact his back.   #. Bradycardia, SVT and ventricular tachycardia (2 runs of 6 and 7 beats each) - bradycardia seems to have resolved.  EPS consult 2021 and cardiology is following. EP felt no indication at present to consider intervention with low burden of disease and asymptomatic.   #. Hypertension - better keeping his weight down and not working. Room to increase his diltiazem.  His home pressures  are better than here today.  HE will continue to check.    #. CAD, CABG 1998 (LIMA to LAD, LRA to posterior descending artery), hyperlipidemia - Appreciate Cunningham input, stress ECHO October 2018 with normal EF, no wall motion abnormalities, no evidence of ischemia. On statin, aspirin, ARB  #. Anxiety - discussed with him that our plan was to wean the lorazepam after he retired and we did that. He is doing great with regards to this.    #. Ankle edema - suspect venous insufficiency.  Last ECHO 2021 with normal EF.   #. Prostate Ca, erectile dysfunction - post prostatecomy July 2013. Uses cialis or viagra but lately feels like has heart burn with the medication so less likely to use. No longer follows with urology at [facility] repeat PSA ordered today  #. GERD - on PPI for lifetime per [facility]UM[facility]   #. Schatzki ring - successful second dilatation per [facility].  #. NEvi - derm follow up  #. Macrocytosis - Drinks wine with meals 5 times a week  #. Tremor - has strong family history of tremor, will cut back caffeine and less of an issue lately.   #. Health Maintenance   - Full physical exam March 2024.    - Immunizations current. Should have TdaP, RSV at pharmacy.  Due for Prevnar 20   -  LAbs ordered.   - colonoscopy 2020, repeat 2025 with tubular adenoma  - encourage healthy behavior, reviewed consultant notes and plans,  he has good support from partner, daughter and grandsons,  he considers his health good      Gilma Partida MD

## 2024-03-21 NOTE — PATIENT INSTRUCTIONS
Patient Discussion/Summary     1. Good to see you today  2. On exam today you do have droopy eyelids, nasal turbinate fullness on both sides, swelling in both ankles but heart and lungs sound good. Your weight is up 9 pounds from last year in the top part of your range.    3. For the allergies and the cold symptoms please use the FLONASE, the PURPLE inhaler called ADVAIR one puff twice a day and the MONTELUKAST daily pill 10 mg also to help with the allergies and breathing and to help prevent an asthma exacerbation.  Also prescribed eyedrops for the allergic conjuctivitis when you have the itching and the fullness.    4. Please get the TdaP and RSV vaccinations at the pharmacy.    5. Glad Dr. Turner helped solve the dysphagia.  Lets keep you on the ESOMEPRAZOLE  6. Thanks for the care and attention you are paying to your back.    7. Labs ordered today  8. Colonoscopy 2025  9. If you need to reach the office, call my  JARAD at [phone].   9. Lets have you stop back in 6 months unless you need me sooner

## 2024-03-22 PROBLEM — R13.10 DYSPHAGIA, UNSPECIFIED TYPE: Status: RESOLVED | Noted: 2023-06-12 | Resolved: 2024-03-22

## 2024-03-22 PROBLEM — R94.31 ABNORMAL ELECTROCARDIOGRAM: Status: RESOLVED | Noted: 2023-10-09 | Resolved: 2024-03-22

## 2024-03-22 PROBLEM — Z00.00 MEDICARE ANNUAL WELLNESS VISIT, SUBSEQUENT: Status: ACTIVE | Noted: 2024-03-22

## 2024-03-22 PROBLEM — N52.9 IMPOTENCE: Status: RESOLVED | Noted: 2024-03-21 | Resolved: 2024-03-22

## 2024-03-22 PROBLEM — K44.9 DIAPHRAGMATIC HERNIA: Status: RESOLVED | Noted: 2023-06-12 | Resolved: 2024-03-22

## 2024-04-03 ENCOUNTER — HOSPITAL ENCOUNTER (OUTPATIENT)
Dept: OPERATING ROOM | Facility: CLINIC | Age: 79
Setting detail: OUTPATIENT SURGERY
Discharge: HOME | End: 2024-04-03
Payer: MEDICARE

## 2024-04-03 ENCOUNTER — HOSPITAL ENCOUNTER (OUTPATIENT)
Dept: RADIOLOGY | Facility: CLINIC | Age: 79
Setting detail: OUTPATIENT SURGERY
Discharge: HOME | End: 2024-04-03
Payer: MEDICARE

## 2024-04-03 VITALS
RESPIRATION RATE: 16 BRPM | SYSTOLIC BLOOD PRESSURE: 137 MMHG | HEIGHT: 70 IN | WEIGHT: 201.5 LBS | BODY MASS INDEX: 28.85 KG/M2 | DIASTOLIC BLOOD PRESSURE: 72 MMHG | OXYGEN SATURATION: 95 % | TEMPERATURE: 97.9 F | HEART RATE: 76 BPM

## 2024-04-03 DIAGNOSIS — M48.062 SPINAL STENOSIS OF LUMBAR REGION WITH NEUROGENIC CLAUDICATION: ICD-10-CM

## 2024-04-03 PROCEDURE — 2550000001 HC RX 255 CONTRASTS: Performed by: PHYSICAL MEDICINE & REHABILITATION

## 2024-04-03 PROCEDURE — 2500000004 HC RX 250 GENERAL PHARMACY W/ HCPCS (ALT 636 FOR OP/ED): Performed by: PHYSICAL MEDICINE & REHABILITATION

## 2024-04-03 PROCEDURE — 62323 NJX INTERLAMINAR LMBR/SAC: CPT | Performed by: PHYSICAL MEDICINE & REHABILITATION

## 2024-04-03 PROCEDURE — 2500000005 HC RX 250 GENERAL PHARMACY W/O HCPCS: Performed by: PHYSICAL MEDICINE & REHABILITATION

## 2024-04-03 RX ORDER — METHYLPREDNISOLONE ACETATE 40 MG/ML
INJECTION, SUSPENSION INTRA-ARTICULAR; INTRALESIONAL; INTRAMUSCULAR; SOFT TISSUE AS NEEDED
Status: COMPLETED | OUTPATIENT
Start: 2024-04-03 | End: 2024-04-03

## 2024-04-03 RX ORDER — LIDOCAINE HYDROCHLORIDE 5 MG/ML
INJECTION, SOLUTION INFILTRATION; PERINEURAL AS NEEDED
Status: COMPLETED | OUTPATIENT
Start: 2024-04-03 | End: 2024-04-03

## 2024-04-03 RX ADMIN — METHYLPREDNISOLONE ACETATE 40 MG: 40 INJECTION, SUSPENSION INTRA-ARTICULAR; INTRALESIONAL; INTRAMUSCULAR; SOFT TISSUE at 13:10

## 2024-04-03 RX ADMIN — IOHEXOL 1 ML: 240 INJECTION, SOLUTION INTRATHECAL; INTRAVASCULAR; INTRAVENOUS; ORAL at 13:10

## 2024-04-03 RX ADMIN — LIDOCAINE HYDROCHLORIDE 5 ML: 5 INJECTION, SOLUTION INFILTRATION; PERINEURAL at 13:09

## 2024-04-03 ASSESSMENT — PAIN SCALES - GENERAL
PAINLEVEL_OUTOF10: 4
PAINLEVEL_OUTOF10: 0 - NO PAIN

## 2024-04-03 ASSESSMENT — PAIN - FUNCTIONAL ASSESSMENT
PAIN_FUNCTIONAL_ASSESSMENT: 0-10
PAIN_FUNCTIONAL_ASSESSMENT: 0-10

## 2024-04-03 NOTE — PROCEDURES
General    Date/Time: 4/3/2024 1:07 PM    Performed by: Darryl Neely MD  Authorized by: Darryl Neely MD    Consent:     Consent obtained:  Written    Consent given by:  Patient    Risks, benefits, and alternatives were discussed: yes      Risks discussed:  Bleeding, infection, pain and nerve damage    Alternatives discussed:  No treatment, delayed treatment and alternative treatment  Universal protocol:     Procedure explained and questions answered to patient or proxy's satisfaction: yes      Relevant documents present and verified: yes      Test results available: yes      Imaging studies available: yes      Required blood products, implants, devices, and special equipment available: yes      Site/side marked: yes      Immediately prior to procedure, a time out was called: yes      Patient identity confirmed:  Verbally with patient, hospital-assigned identification number and arm band  Procedure specific details:      Lumbar interlaminar RALEIGH     After informed consent was obtained, the patient was brought to the operating room and placed in the prone position.  The back area was prepped and draped in usual sterile fashion.  Using fluoroscopic guidance, the skin and subcutaneous tissue overlying needle trajectory to the below interlaminar epidural space were anesthetized with a total of 5 mL of 0.5% lidocaine in the below paraspinous approach.  An 18-gauge Tuohy needle was then advanced under fluoroscopic guidance with the below paraspinous approach into the below  interlaminar epidural space. Entry of the epidural space was confirmed using loss of resistance technique with a glass syringe and 2 mL of air.  Injection of Iohexol contrast revealed appropriate spread without vascular uptake. Subsequently, the below medications were injected.  The needle was removed.  The patient tolerated the procedure well.  The patient was then transferred to recovery room in stable condition. The patient will participate in  physical therapy, update us on his/her response, follow up with us on outpatient basis as needed.    Level: L5-S1  Medications [4 mL of 0.5% lidocaine and 40 mg methylprednisolone]

## 2024-04-04 NOTE — ADDENDUM NOTE
Encounter addended by: Carmen Pineda RN on: 4/4/2024 10:27 AM   Actions taken: Contacts section saved, Flowsheet accepted

## 2024-05-08 ENCOUNTER — OFFICE VISIT (OUTPATIENT)
Dept: CARDIOLOGY | Facility: CLINIC | Age: 79
End: 2024-05-08
Payer: MEDICARE

## 2024-05-08 VITALS
DIASTOLIC BLOOD PRESSURE: 81 MMHG | OXYGEN SATURATION: 93 % | SYSTOLIC BLOOD PRESSURE: 153 MMHG | HEIGHT: 70 IN | BODY MASS INDEX: 28.2 KG/M2 | WEIGHT: 197 LBS | HEART RATE: 80 BPM | RESPIRATION RATE: 18 BRPM

## 2024-05-08 DIAGNOSIS — I10 HYPERTENSION, UNSPECIFIED TYPE: ICD-10-CM

## 2024-05-08 DIAGNOSIS — I25.10 CORONARY ARTERY DISEASE INVOLVING NATIVE CORONARY ARTERY OF NATIVE HEART WITHOUT ANGINA PECTORIS: Primary | ICD-10-CM

## 2024-05-08 DIAGNOSIS — E78.5 HYPERLIPIDEMIA, UNSPECIFIED HYPERLIPIDEMIA TYPE: ICD-10-CM

## 2024-05-08 PROCEDURE — 93010 ELECTROCARDIOGRAM REPORT: CPT | Performed by: INTERNAL MEDICINE

## 2024-05-08 PROCEDURE — 99214 OFFICE O/P EST MOD 30 MIN: CPT | Performed by: NURSE PRACTITIONER

## 2024-05-08 PROCEDURE — 1036F TOBACCO NON-USER: CPT | Performed by: NURSE PRACTITIONER

## 2024-05-08 PROCEDURE — 1126F AMNT PAIN NOTED NONE PRSNT: CPT | Performed by: NURSE PRACTITIONER

## 2024-05-08 PROCEDURE — 3077F SYST BP >= 140 MM HG: CPT | Performed by: NURSE PRACTITIONER

## 2024-05-08 PROCEDURE — 3079F DIAST BP 80-89 MM HG: CPT | Performed by: NURSE PRACTITIONER

## 2024-05-08 PROCEDURE — 1159F MED LIST DOCD IN RCRD: CPT | Performed by: NURSE PRACTITIONER

## 2024-05-08 PROCEDURE — 93005 ELECTROCARDIOGRAM TRACING: CPT | Performed by: NURSE PRACTITIONER

## 2024-05-08 PROCEDURE — 1160F RVW MEDS BY RX/DR IN RCRD: CPT | Performed by: NURSE PRACTITIONER

## 2024-05-08 ASSESSMENT — PAIN SCALES - GENERAL: PAINLEVEL: 0-NO PAIN

## 2024-05-16 LAB
ATRIAL RATE: 77 BPM
P AXIS: 74 DEGREES
P OFFSET: 174 MS
P ONSET: 118 MS
PR INTERVAL: 148 MS
Q ONSET: 192 MS
QRS COUNT: 13 BEATS
QRS DURATION: 134 MS
QT INTERVAL: 406 MS
QTC CALCULATION(BAZETT): 459 MS
QTC FREDERICIA: 441 MS
R AXIS: 59 DEGREES
T AXIS: 41 DEGREES
T OFFSET: 395 MS
VENTRICULAR RATE: 77 BPM

## 2024-05-20 DIAGNOSIS — I25.10 CORONARY ARTERY DISEASE INVOLVING NATIVE CORONARY ARTERY OF NATIVE HEART WITHOUT ANGINA PECTORIS: Primary | ICD-10-CM

## 2024-05-20 DIAGNOSIS — K44.9 GASTROESOPHAGEAL REFLUX DISEASE WITH HIATAL HERNIA: ICD-10-CM

## 2024-05-20 DIAGNOSIS — K21.9 GASTROESOPHAGEAL REFLUX DISEASE WITH HIATAL HERNIA: ICD-10-CM

## 2024-05-20 DIAGNOSIS — K22.2 SCHATZKI'S RING: ICD-10-CM

## 2024-05-20 RX ORDER — EZETIMIBE 10 MG/1
10 TABLET ORAL DAILY
Qty: 90 TABLET | Refills: 3 | Status: SHIPPED | OUTPATIENT
Start: 2024-05-20 | End: 2025-05-20

## 2024-05-20 RX ORDER — ATORVASTATIN CALCIUM 40 MG/1
40 TABLET, FILM COATED ORAL DAILY
Qty: 90 TABLET | Refills: 3 | Status: SHIPPED | OUTPATIENT
Start: 2024-05-20 | End: 2025-05-20

## 2024-05-20 RX ORDER — HYDROGEN PEROXIDE 3 %
20 SOLUTION, NON-ORAL MISCELLANEOUS
Qty: 90 CAPSULE | Refills: 3 | Status: SHIPPED | OUTPATIENT
Start: 2024-05-20 | End: 2025-05-20

## 2024-05-20 RX ORDER — OLMESARTAN MEDOXOMIL 40 MG/1
40 TABLET ORAL DAILY
Qty: 90 TABLET | Refills: 3 | Status: SHIPPED | OUTPATIENT
Start: 2024-05-20 | End: 2025-05-20

## 2024-05-20 RX ORDER — DILTIAZEM HYDROCHLORIDE 300 MG/1
300 CAPSULE, COATED, EXTENDED RELEASE ORAL DAILY
Qty: 90 CAPSULE | Refills: 3 | Status: SHIPPED | OUTPATIENT
Start: 2024-05-20 | End: 2025-05-20

## 2024-07-03 ENCOUNTER — PREP FOR PROCEDURE (OUTPATIENT)
Dept: OPERATING ROOM | Facility: CLINIC | Age: 79
End: 2024-07-03
Payer: MEDICARE

## 2024-07-03 ENCOUNTER — TELEPHONE (OUTPATIENT)
Dept: PAIN MEDICINE | Facility: CLINIC | Age: 79
End: 2024-07-03
Payer: MEDICARE

## 2024-07-03 DIAGNOSIS — M48.062 SPINAL STENOSIS OF LUMBAR REGION WITH NEUROGENIC CLAUDICATION: Primary | ICD-10-CM

## 2024-07-03 RX ORDER — DIAZEPAM 5 MG/1
5 TABLET ORAL ONCE AS NEEDED
OUTPATIENT
Start: 2024-07-03

## 2024-07-03 NOTE — TELEPHONE ENCOUNTER
Spoke with patient over the phone. He would like to schedule another L5-S1 Interlaminar RALEIGH. His last injection was 4/3/24 and has provided him with almost complete relief of his low back pain for the past 3 months. He is just now experiencing an increase in his low back pain which he says is the usual amount of time it takes for the injection to start wearing off. We will schedule him for a repeat L5-S1 RALEIGH in mid July as he does so well with managing his pain with these injections.

## 2024-07-19 ENCOUNTER — HOSPITAL ENCOUNTER (OUTPATIENT)
Dept: OPERATING ROOM | Facility: CLINIC | Age: 79
Setting detail: OUTPATIENT SURGERY
Discharge: HOME | End: 2024-07-19
Payer: MEDICARE

## 2024-07-19 VITALS
TEMPERATURE: 97 F | OXYGEN SATURATION: 98 % | WEIGHT: 195 LBS | HEART RATE: 62 BPM | RESPIRATION RATE: 16 BRPM | DIASTOLIC BLOOD PRESSURE: 65 MMHG | SYSTOLIC BLOOD PRESSURE: 139 MMHG | BODY MASS INDEX: 27.98 KG/M2

## 2024-07-19 DIAGNOSIS — M48.062 SPINAL STENOSIS OF LUMBAR REGION WITH NEUROGENIC CLAUDICATION: ICD-10-CM

## 2024-07-19 PROCEDURE — 2550000001 HC RX 255 CONTRASTS: Performed by: PHYSICAL MEDICINE & REHABILITATION

## 2024-07-19 PROCEDURE — 2500000004 HC RX 250 GENERAL PHARMACY W/ HCPCS (ALT 636 FOR OP/ED): Performed by: PHYSICAL MEDICINE & REHABILITATION

## 2024-07-19 PROCEDURE — 62323 NJX INTERLAMINAR LMBR/SAC: CPT | Performed by: PHYSICAL MEDICINE & REHABILITATION

## 2024-07-19 PROCEDURE — 2500000005 HC RX 250 GENERAL PHARMACY W/O HCPCS: Performed by: PHYSICAL MEDICINE & REHABILITATION

## 2024-07-19 RX ORDER — LIDOCAINE HYDROCHLORIDE 5 MG/ML
INJECTION, SOLUTION INFILTRATION; INTRAVENOUS AS NEEDED
Status: COMPLETED | OUTPATIENT
Start: 2024-07-19 | End: 2024-07-19

## 2024-07-19 RX ORDER — METHYLPREDNISOLONE ACETATE 40 MG/ML
INJECTION, SUSPENSION INTRA-ARTICULAR; INTRALESIONAL; INTRAMUSCULAR; SOFT TISSUE AS NEEDED
Status: COMPLETED | OUTPATIENT
Start: 2024-07-19 | End: 2024-07-19

## 2024-07-19 ASSESSMENT — PAIN SCALES - GENERAL
PAINLEVEL_OUTOF10: 0 - NO PAIN
PAINLEVEL_OUTOF10: 3

## 2024-07-19 ASSESSMENT — PATIENT HEALTH QUESTIONNAIRE - PHQ9
1. LITTLE INTEREST OR PLEASURE IN DOING THINGS: NOT AT ALL
SUM OF ALL RESPONSES TO PHQ9 QUESTIONS 1 AND 2: 0
2. FEELING DOWN, DEPRESSED OR HOPELESS: NOT AT ALL

## 2024-07-19 ASSESSMENT — PAIN - FUNCTIONAL ASSESSMENT: PAIN_FUNCTIONAL_ASSESSMENT: 0-10

## 2024-07-19 NOTE — H&P
Pain Management H&P    History Of Present Illness  Neo Mckee is a 78 y.o. male presents for procedure state below. Endorses no changes in past medical history or medical health since last seen in clinic.      Past Medical History  He has a past medical history of Atherosclerotic heart disease of native coronary artery without angina pectoris (10/12/2022), Dysphagia, unspecified (02/25/2015), Encounter for screening for malignant neoplasm of colon (08/04/2020), Esophageal obstruction (09/28/2015), Essential (primary) hypertension (10/12/2022), Hyperlipidemia, unspecified (10/12/2022), Other conditions influencing health status (03/24/2022), Other fecal abnormalities (01/28/2021), Other specified cardiac arrhythmias (08/12/2021), Pain in left foot (11/12/2015), Personal history of other diseases of the circulatory system (08/12/2021), Personal history of other diseases of the digestive system (04/05/2018), Personal history of other diseases of the respiratory system (03/24/2022), Personal history of other diseases of the respiratory system (12/23/2015), Personal history of other drug therapy (10/11/2018), Personal history of other mental and behavioral disorders (10/11/2018), Personal history of other specified conditions (08/30/2021), Personal history of pneumonia (recurrent) (04/28/2022), Plantar fascial fibromatosis (01/28/2021), and Ventricular tachycardia, unspecified (Multi) (08/12/2021).    Surgical History  He has a past surgical history that includes Other surgical history (02/20/2015) and Coronary artery bypass graft (10/11/2018).     Social History  He reports that he has never smoked. He has never been exposed to tobacco smoke. He has never used smokeless tobacco. He reports current alcohol use of about 2.0 standard drinks of alcohol per week. He reports that he does not use drugs.    Family History  No family history on file.     Allergies  Beta-blockers (beta-adrenergic blocking agts) and Poison ivy  extract    Review of Symptoms:   Constitutional: Negative for chills, diaphoresis or fever  HENT: Negative for neck swelling  Eyes:.  Negative for eye pain  Respiratory:.  Negative for cough, shortness of breath or wheezing    Cardiovascular:.  Negative for chest pain or palpitations  Gastrointestinal:.  Negative for abdominal pain, nausea and vomiting  Genitourinary:.  Negative for urgency  Musculoskeletal:  Positive for back pain. Positive for joint pain. Denies falls within the past 3 months.  Skin: Negative for wounds or itching   Neurological: Negative for dizziness, seizures, loss of consciousness and weakness  Endo/Heme/Allergies: Does not bruise/bleed easily  Psychiatric/Behavioral: Negative for depression. The patient does not appear anxious.       PHYSICAL EXAM  Vitals signs reviewed  Constitutional:       General: Not in acute distress     Appearance: Normal appearance. Not ill-appearing.  HENT:     Head: Normocephalic and atraumatic  Eyes:     Conjunctiva/sclera: Conjunctivae normal  Cardiovascular:     Rate and Rhythm: Normal rate and regular rhythm  Pulmonary:     Effort: No respiratory distress  Abdominal:     Palpations: Abdomen is soft  Musculoskeletal: AMES  Skin:     General: Skin is warm and dry  Neurological:     General: No focal deficit present  Psychiatric:         Mood and Affect: Mood normal         Behavior: Behavior normal     Last Recorded Vitals  /61   Pulse 75   Temp 36.6 °C (97.9 °F)   Resp 20   Wt 88.5 kg (195 lb)   SpO2 95%     Relevant Results  Current Outpatient Medications   Medication Instructions    aspirin 81 mg capsule 1 tablet, oral, Daily    atorvastatin (LIPITOR) 40 mg, oral, Daily    dilTIAZem CD (CARDIZEM CD) 300 mg, oral, Daily    esomeprazole (NEXIUM) 20 mg, oral, Daily before breakfast    ezetimibe (ZETIA) 10 mg, oral, Daily    fluticasone (Flonase) 50 mcg/actuation nasal spray 1 spray, Each Nostril, Daily    fluticasone propion-salmeteroL (Advair Diskus)  250-50 mcg/dose diskus inhaler     furosemide (LASIX) 10 mg, oral, Daily, As needed for swelling    multivitamin tablet 1 tablet, oral, Daily RT    olmesartan (BENICAR) 40 mg, oral, Daily    olopatadine (Pataday) 0.2 % ophthalmic solution 1 drop, Both Eyes, Daily    Ventolin HFA 90 mcg/actuation inhaler 2 puffs, inhalation, Every 4 hours PRN         MR lumbar spine wo IV contrast     Narrative  PERFORMED AT [facility] LOCATION:[facility] [place]  Clinical Information: Low back pain radiating into buttocks, right worse  than left.    Study Technique:  MRI lumbar spine was performed sagittal T1, T2 and  STIR images.  Axial T1 and axial T2 images were also acquired.    Comparisons: None    Findings:    For purposes of numbering lumbar vertebral bodies on this study the most  inferior normal diameter disc space will be considered L5-S1. No  transitional vertebral body segments. Plain film radiographs would be  required to confirm nomenclature used in this report, particularly prior  to any spine intervention.    Vertebral body height: No loss of height noted throughout the vertebral  bodies.  No compression deformities are detected. Endplate spurring is  seen at multiple levels.    Disc height and Disc signal:  Diffuse disk space narrowing at L2-L3 ,  L3-L4 , L4-L5 and disk desiccation noted throughout the vertebral  intervertebral discs.    Alignment: Straightening of the lumbosacral spine noted.  Rotary  scoliosis is noted.  Grade 1 retrolisthesis of L2 on L3 and L3 on L4  vertebra noted        Bone marrow signal: Modic type 1/2 end plate changes are seen. Schmorl's  nodes at multiple levels.            Conus medullaris:    Extends to the L1 level.        Paraspinal soft tissues: No abnormality seen .            Other findings: No acute findings        L1-2: No circumferential disc bulge / herniation .  There is no  neural  foraminal stenosis.   Ligamentum flavum hypertrophy , facet arthropathy  noted.  No  central canal or  lateral recess stenosis detected.          L2-3: Disc bulge with posterior endplate spurring is seen effacing the  thecal sac  There is moderate foraminal stenosis.  Ligamentum flavum   hypertrophy and facet arthropathy noted.  Mild central canal stenosis is  seen at this level.    L3-4: Disc bulge with posterior endplate spurring is seen effacing the  thecal sac  There is moderate to severe foraminal stenosis.  Ligamentum  flavum hypertrophy and facet arthropathy noted.  Moderate central canal  stenosis is seen at this level.    L4-5: Disc bulge with posterior endplate spurring is seen effacing the  thecal sac  There is moderate to severe foraminal stenosis.  Ligamentum  flavum hypertrophy and facet arthropathy noted.  Moderate central canal  stenosis is seen at this level.    L5-S1: Posterior disc bulge is seen indenting the thecal sac .  There is  no  neural foraminal stenosis. Facet arthropathy is seen. No central  canal or lateral recess stenosis detected.      Impressions:    1.  Multilevel moderate lumbar spondylosis with Modic changes and  retrolisthesis as detailed above.    2.  Disc osteophyte complex at L2-3, L3-4 and L4-5 seen causing varying  central canal and foraminal stenosis as detailed above.    3.  Posterior disc bulge at L5-S1 seen indenting the thecal sac.          Referring physician: Please call [phone] if you would like to speak  with the radiologist about this report.                  Report reported and signed by Naren Bustamante on 10/27/2021 2896     No image results found.       1. Spinal stenosis of lumbar region with neurogenic claudication  FL pain management    FL pain management    Epidural Steroid Injection    Epidural Steroid Injection           ASSESSMENT/PLAN  Neo Mckee is a 78 y.o. male presents for repeat L5/S1 interlaminar epidural steroid injection.    Patient denies any recent antibiotic use or infections, denies any blood thinner use, and denies  contrast or local anesthetic allergies     Risks, benefits, alternatives discussed. All questions answered to the best of my ability. Patient agrees to proceed.      Our plan is as follows:  - Proceed with aforementioned procedure          DO SAMMY Chinchilla Pain fellow

## 2024-07-22 NOTE — ADDENDUM NOTE
Encounter addended by: Verenice Wynn RN on: 7/22/2024 12:59 PM   Actions taken: Contacts section saved, Flowsheet accepted

## 2024-09-12 ENCOUNTER — APPOINTMENT (OUTPATIENT)
Dept: PRIMARY CARE | Facility: CLINIC | Age: 79
End: 2024-09-12
Payer: MEDICARE

## 2024-09-12 VITALS
BODY MASS INDEX: 27.92 KG/M2 | SYSTOLIC BLOOD PRESSURE: 134 MMHG | WEIGHT: 195 LBS | OXYGEN SATURATION: 94 % | HEIGHT: 70 IN | TEMPERATURE: 97.6 F | DIASTOLIC BLOOD PRESSURE: 60 MMHG | HEART RATE: 86 BPM

## 2024-09-12 DIAGNOSIS — K22.2 SCHATZKI'S RING: ICD-10-CM

## 2024-09-12 DIAGNOSIS — J45.909 EXTRINSIC ASTHMA, UNSPECIFIED ASTHMA SEVERITY, UNSPECIFIED WHETHER COMPLICATED, UNSPECIFIED WHETHER PERSISTENT (HHS-HCC): Primary | ICD-10-CM

## 2024-09-12 DIAGNOSIS — I10 HYPERTENSION, UNSPECIFIED TYPE: ICD-10-CM

## 2024-09-12 DIAGNOSIS — I25.10 CORONARY ARTERY DISEASE INVOLVING NATIVE CORONARY ARTERY OF NATIVE HEART WITHOUT ANGINA PECTORIS: ICD-10-CM

## 2024-09-12 DIAGNOSIS — J30.9 ALLERGIC RHINITIS, UNSPECIFIED SEASONALITY, UNSPECIFIED TRIGGER: ICD-10-CM

## 2024-09-12 PROCEDURE — 1159F MED LIST DOCD IN RCRD: CPT | Performed by: INTERNAL MEDICINE

## 2024-09-12 PROCEDURE — 90662 IIV NO PRSV INCREASED AG IM: CPT | Performed by: INTERNAL MEDICINE

## 2024-09-12 PROCEDURE — 90677 PCV20 VACCINE IM: CPT | Performed by: INTERNAL MEDICINE

## 2024-09-12 PROCEDURE — 1160F RVW MEDS BY RX/DR IN RCRD: CPT | Performed by: INTERNAL MEDICINE

## 2024-09-12 PROCEDURE — 99213 OFFICE O/P EST LOW 20 MIN: CPT | Performed by: INTERNAL MEDICINE

## 2024-09-12 PROCEDURE — G0008 ADMIN INFLUENZA VIRUS VAC: HCPCS | Performed by: INTERNAL MEDICINE

## 2024-09-12 PROCEDURE — 3075F SYST BP GE 130 - 139MM HG: CPT | Performed by: INTERNAL MEDICINE

## 2024-09-12 PROCEDURE — G0009 ADMIN PNEUMOCOCCAL VACCINE: HCPCS | Performed by: INTERNAL MEDICINE

## 2024-09-12 PROCEDURE — 3078F DIAST BP <80 MM HG: CPT | Performed by: INTERNAL MEDICINE

## 2024-09-12 RX ORDER — FUROSEMIDE 20 MG/1
10 TABLET ORAL DAILY
Qty: 30 TABLET | Refills: 3 | Status: SHIPPED | OUTPATIENT
Start: 2024-09-12

## 2024-09-12 RX ORDER — FLUTICASONE PROPIONATE 50 MCG
1 SPRAY, SUSPENSION (ML) NASAL DAILY
Qty: 16 G | Refills: 11 | Status: SHIPPED | OUTPATIENT
Start: 2024-09-12 | End: 2024-10-12

## 2024-09-12 RX ORDER — FLUTICASONE PROPIONATE AND SALMETEROL 250; 50 UG/1; UG/1
1 POWDER RESPIRATORY (INHALATION)
Qty: 60 EACH | Refills: 11 | Status: SHIPPED | OUTPATIENT
Start: 2024-09-12 | End: 2025-09-12

## 2024-09-12 RX ORDER — MONTELUKAST SODIUM 10 MG/1
10 TABLET ORAL NIGHTLY
Qty: 30 TABLET | Refills: 5 | Status: SHIPPED | OUTPATIENT
Start: 2024-09-12 | End: 2025-03-11

## 2024-09-12 RX ORDER — ALBUTEROL SULFATE 90 UG/1
2 AEROSOL, METERED RESPIRATORY (INHALATION) EVERY 4 HOURS PRN
Qty: 18 G | Refills: 3 | Status: SHIPPED | OUTPATIENT
Start: 2024-09-12

## 2024-09-12 NOTE — PATIENT INSTRUCTIONS
Good to see you   You look fabulous today.  Your pressure on my taking was good.  Your heart and lungs sound great.  Glad to know that your back is causing you less distress  Your grandsons are so una to have you in their world.  We added MONTELUKAST (Singulair) to help with the allergic rhinitis  See you in April for your annual exam but of course here if you would need me before then.

## 2024-09-12 NOTE — PROGRESS NOTES
Gave pt flu vaccine and prev cramer 20 in left upper arm. No adverse reactions to either, meds used from stock pile.

## 2024-10-04 ENCOUNTER — PREP FOR PROCEDURE (OUTPATIENT)
Dept: PAIN MEDICINE | Facility: CLINIC | Age: 79
End: 2024-10-04
Payer: MEDICARE

## 2024-10-04 DIAGNOSIS — M48.062 SPINAL STENOSIS OF LUMBAR REGION WITH NEUROGENIC CLAUDICATION: Primary | ICD-10-CM

## 2024-10-04 RX ORDER — DIAZEPAM 5 MG/1
5 TABLET ORAL ONCE AS NEEDED
OUTPATIENT
Start: 2024-10-04

## 2024-11-06 ENCOUNTER — OFFICE VISIT (OUTPATIENT)
Dept: CARDIOLOGY | Facility: CLINIC | Age: 79
End: 2024-11-06
Payer: MEDICARE

## 2024-11-06 VITALS
HEART RATE: 76 BPM | BODY MASS INDEX: 27.49 KG/M2 | DIASTOLIC BLOOD PRESSURE: 67 MMHG | OXYGEN SATURATION: 94 % | HEIGHT: 70 IN | SYSTOLIC BLOOD PRESSURE: 128 MMHG | WEIGHT: 192 LBS

## 2024-11-06 DIAGNOSIS — E78.5 HYPERLIPIDEMIA, UNSPECIFIED HYPERLIPIDEMIA TYPE: ICD-10-CM

## 2024-11-06 DIAGNOSIS — I25.10 CORONARY ARTERY DISEASE INVOLVING NATIVE CORONARY ARTERY OF NATIVE HEART WITHOUT ANGINA PECTORIS: Primary | ICD-10-CM

## 2024-11-06 DIAGNOSIS — I10 HYPERTENSION, UNSPECIFIED TYPE: ICD-10-CM

## 2024-11-06 PROCEDURE — 3078F DIAST BP <80 MM HG: CPT | Performed by: NURSE PRACTITIONER

## 2024-11-06 PROCEDURE — 99214 OFFICE O/P EST MOD 30 MIN: CPT | Performed by: NURSE PRACTITIONER

## 2024-11-06 PROCEDURE — 3074F SYST BP LT 130 MM HG: CPT | Performed by: NURSE PRACTITIONER

## 2024-11-06 PROCEDURE — 1036F TOBACCO NON-USER: CPT | Performed by: NURSE PRACTITIONER

## 2024-11-06 PROCEDURE — 1160F RVW MEDS BY RX/DR IN RCRD: CPT | Performed by: NURSE PRACTITIONER

## 2024-11-06 PROCEDURE — 1159F MED LIST DOCD IN RCRD: CPT | Performed by: NURSE PRACTITIONER

## 2024-11-06 PROCEDURE — 1126F AMNT PAIN NOTED NONE PRSNT: CPT | Performed by: NURSE PRACTITIONER

## 2024-11-06 ASSESSMENT — PAIN SCALES - GENERAL: PAINLEVEL_OUTOF10: 0-NO PAIN

## 2024-11-06 ASSESSMENT — ENCOUNTER SYMPTOMS: HYPERTENSION: 1

## 2024-11-06 NOTE — PROGRESS NOTES
Subjective   Neo Mckee is a 78 y.o. male.    Chief Complaint:  Follow-up, Hypertension, Hyperlipidemia, and Coronary Artery Disease    Mr. Mckee returns for a routine 6 month follow up. He has been feeling well from a cardiac standpoint. He has remained compliant with his medications, denying any intolerances. He remains active walking at the [facility] center, denying any exertional chest pain or shortness of breath. He denies any recent ER visits or hospitalizations. He offers no specific cardiovascular complaints or concerns today. He denies any complaints of chest pain, shortness of breath, lightheadedness, dizziness, palpitations, syncope, orthopnea, paroxysmal nocturnal dyspnea, lower extremity swelling or bleeding concerns.     Hypertension    Hyperlipidemia    Coronary Artery Disease  Risk factors include hyperlipidemia.       Review of Systems   All other systems reviewed and are negative.      Objective   Physical Exam  Constitutional:       Appearance: Healthy appearance. In no distress  Pulmonary:      Effort: Pulmonary effort is normal.      Breath sounds: Normal breath sounds.   Cardiovascular:      Normal rate. Regular rhythm. Normal S1. Normal S2.       Murmurs: There is no murmur.      Carotids: right carotid pulse +2, no bruit heard over the right carotid. left carotid pulse +2, no bruit heard over the left carotid.  Edema:     Peripheral edema absent.   Abdominal:      Palpations: Abdomen is soft.   Musculoskeletal:       Cervical back: Normal range of motion.   Skin:     General: Skin is warm and dry. Normal color and pigmentation   Neurological:      Mental Status: Alert and oriented to person, place and time.   Psychiatric:     Mood and Affect: appropriate mood and appropriate affect.       Lab Review:   Lab Results   Component Value Date     03/21/2024    K 4.1 03/21/2024     03/21/2024    CO2 28 03/21/2024    BUN 15 03/21/2024    CREATININE 0.87 03/21/2024    GLUCOSE 100 (H)  03/21/2024    CALCIUM 9.6 03/21/2024     Lab Results   Component Value Date    WBC 5.5 03/21/2024    HGB 15.7 03/21/2024    HCT 46.6 03/21/2024    MCV 99 03/21/2024     03/21/2024     Lab Results   Component Value Date    CHOL 134 03/21/2024    TRIG 76 03/21/2024    HDL 57.9 03/21/2024       Assessment/Plan   Mr. Mckee is a pleasant 78 year old  male with a past medical history significant for hypertension, hyperlipidemia, back pain followed by pain management and CAD s/p remote 2 vessel CABG in 1998. Stress-echo in 2018 showed no evidence of ischemia with preserved LV function. Echocardiogram 8/2021 showed an EF of 55-60% with mild MR. He presents today for routine follow up stable from a cardiac standpoint. His VS remain stable. He remains on appropriate antiplatelet and lipid lowering therapy with his LDL at goal. He struggles with back/sciatica pain, though denies any chest pain or shortness of breath at his current activity level. I will have him continue all medications unchanged. We will not embark on any additional cardiovascular testing at this time. He will follow up with me in clinic in 6 months. He knows to call for any concerns.

## 2024-11-14 NOTE — H&P
HISTORY AND PHYSICAL    History Of Present Illness  Neo Mckee is a 78 y.o. male presenting with chronic pain.  Here for Lumbar interlaminar epidural steroid injection    he denies any recent antibiotic use or infections, he denies any blood thinner use other than ASA81 , and he denies contrast or local anesthetic allergies     Pre-sedation evaluation:  ASA Classification (bolded):   ASA I: Healthy patient, non-smoking, no none or minimal alcohol use  ASA II: Patient with mild systemic disease, without substantiative functional limitations.  Current smoker, social alcohol drinker, pregnancy, obesity (BMI 30-40)DM/HTN,, well-controlled mild lung disease  ASA III: Patient with severe systemic disease; substantiative of functional limitation; One or more moderate to severe diseases: Poorly controlled DM/HTN, COPD, morbid obesity (BMI>40), active hepatitis, alcohol abuse/dependence, implanted pacemaker, moderate reduction of ejection fraction, ESRD on dialysis, history (>3months) of MI, CVA, TIA or CAD/stents  ASA IV: Patient with severe systemic disease that is a constant threat to life; recent (<3 months) MI, CVA, TIA or CAD/stents, ongoing cardiac ischemia or severe valvular dysfunction, severely reduced ejection fraction, shock, sepsis, DIC, ESRD not undergoing regular scheduled dialysis    Mallampati score (bolded):   Class I: Complete visualization of the soft palate  Class II: Complete visualization of the uvula  Class III: Visualization of only the base of the uvula  Class IV: Soft palate is not visible at all    Past Medical History  Past Medical History:   Diagnosis Date    Atherosclerotic heart disease of native coronary artery without angina pectoris 10/12/2022    Coronary artery disease    Dysphagia, unspecified 02/25/2015    Food sticks on swallowing    Encounter for screening for malignant neoplasm of colon 08/04/2020    Colon cancer screening    Esophageal obstruction 09/28/2015    Esophageal stricture     Essential (primary) hypertension 10/12/2022    Hypertension    Hyperlipidemia, unspecified 10/12/2022    Hyperlipidemia    Other conditions influencing health status 03/24/2022    History of cough    Other fecal abnormalities 01/28/2021    Positive colorectal cancer screening using Cologuard test    Other specified cardiac arrhythmias 08/12/2021    Fusion beats    Pain in left foot 11/12/2015    Left foot pain    Personal history of other diseases of the circulatory system 08/12/2021    History of paroxysmal supraventricular tachycardia    Personal history of other diseases of the digestive system 04/05/2018    History of esophageal reflux    Personal history of other diseases of the respiratory system 03/24/2022    History of acute bronchitis    Personal history of other diseases of the respiratory system 12/23/2015    History of acute bronchitis    Personal history of other drug therapy 10/11/2018    History of influenza vaccination    Personal history of other mental and behavioral disorders 10/11/2018    History of anxiety    Personal history of other specified conditions 08/30/2021    History of bradycardia    Personal history of pneumonia (recurrent) 04/28/2022    History of pneumonia    Plantar fascial fibromatosis 01/28/2021    Plantar fasciitis of left foot    Ventricular tachycardia, unspecified (Multi) 08/12/2021    Ventricular tachycardia seen on cardiac monitor       Surgical History  Past Surgical History:   Procedure Laterality Date    CORONARY ARTERY BYPASS GRAFT  10/11/2018    CABG    OTHER SURGICAL HISTORY  02/20/2015    Prostatectomy Robotic-Assisted        Social History  He reports that he has never smoked. He has never been exposed to tobacco smoke. He has never used smokeless tobacco. He reports current alcohol use of about 2.0 standard drinks of alcohol per week. He reports that he does not use drugs.    Family History  No family history on file.     Allergies  Beta-blockers  "(beta-adrenergic blocking agts)    Review of Systems   12 point ROS done and negative except for the above.   Physical Exam     General: NAD, well groomed, well nourished  Eyes: Non-icteric sclera, EOMI  Ears, Nose, Mouth, and Throat: External ears and nose appear to be without deformity or rash. No lesions or masses noted. Hearing is grossly intact.   Neck: Trachea midline  Respiratory: Nonlabored breathing   Cardiovascular: No peripheral edema   Skin: No rashes or open lesions/ulcers identified on skin.    Last Recorded Vitals  There were no vitals taken for this visit.    Relevant Results  Current Outpatient Medications   Medication Instructions    aspirin 81 mg capsule 1 tablet, Daily    atorvastatin (LIPITOR) 40 mg, oral, Daily    dilTIAZem CD (CARDIZEM CD) 300 mg, oral, Daily    esomeprazole (NEXIUM) 20 mg, oral, Daily before breakfast    ezetimibe (ZETIA) 10 mg, oral, Daily    fluticasone (Flonase) 50 mcg/actuation nasal spray 1 spray, Each Nostril, Daily    fluticasone propion-salmeteroL (Advair Diskus) 250-50 mcg/dose diskus inhaler 1 puff, inhalation, 2 times daily RT, Rinse mouth with water after use to reduce aftertaste and incidence of candidiasis. Do not swallow.    furosemide (LASIX) 10 mg, oral, Daily, As needed for swelling    multivitamin tablet 1 tablet, Daily RT    olmesartan (BENICAR) 40 mg, oral, Daily    Ventolin HFA 90 mcg/actuation inhaler 2 puffs, inhalation, Every 4 hours PRN      Lab Results   Component Value Date    WBC 5.5 03/21/2024    HGB 15.7 03/21/2024    HCT 46.6 03/21/2024    MCV 99 03/21/2024     03/21/2024      No results found for: \"INR\", \"PROTIME\"  No results found for: \"PTT\"  Lab Results   Component Value Date    GLUCOSE 100 (H) 03/21/2024    CALCIUM 9.6 03/21/2024     03/21/2024    K 4.1 03/21/2024    CO2 28 03/21/2024     03/21/2024    BUN 15 03/21/2024    CREATININE 0.87 03/21/2024              Assessment/Plan   Neo Mckee is a 78 y.o. M who presents " for Lumbar interlaminar epidural steroid injection.     Risks, benefits, alternatives discussed. All questions answered to the best of my ability. Patient agrees to proceed. Consent signed and patient marked appropriately.    -We will proceed with planned procedure        Castillo Castaneda MD  Interventional Pain Fellow, PGY-5  [facility]

## 2024-11-15 ENCOUNTER — HOSPITAL ENCOUNTER (OUTPATIENT)
Dept: OPERATING ROOM | Facility: CLINIC | Age: 79
Setting detail: OUTPATIENT SURGERY
Discharge: HOME | End: 2024-11-15
Payer: MEDICARE

## 2024-11-15 VITALS
TEMPERATURE: 97.7 F | SYSTOLIC BLOOD PRESSURE: 158 MMHG | RESPIRATION RATE: 16 BRPM | BODY MASS INDEX: 29.04 KG/M2 | OXYGEN SATURATION: 98 % | WEIGHT: 202.82 LBS | DIASTOLIC BLOOD PRESSURE: 83 MMHG | HEART RATE: 85 BPM | HEIGHT: 70 IN

## 2024-11-15 DIAGNOSIS — M48.062 SPINAL STENOSIS OF LUMBAR REGION WITH NEUROGENIC CLAUDICATION: ICD-10-CM

## 2024-11-15 PROCEDURE — 62323 NJX INTERLAMINAR LMBR/SAC: CPT | Performed by: PHYSICAL MEDICINE & REHABILITATION

## 2024-11-15 PROCEDURE — 2550000001 HC RX 255 CONTRASTS: Performed by: PHYSICAL MEDICINE & REHABILITATION

## 2024-11-15 PROCEDURE — 2500000004 HC RX 250 GENERAL PHARMACY W/ HCPCS (ALT 636 FOR OP/ED): Performed by: PHYSICAL MEDICINE & REHABILITATION

## 2024-11-15 RX ORDER — LIDOCAINE HYDROCHLORIDE 5 MG/ML
INJECTION, SOLUTION INFILTRATION; INTRAVENOUS AS NEEDED
Status: COMPLETED | OUTPATIENT
Start: 2024-11-15 | End: 2024-11-15

## 2024-11-15 RX ORDER — METHYLPREDNISOLONE ACETATE 40 MG/ML
INJECTION, SUSPENSION INTRA-ARTICULAR; INTRALESIONAL; INTRAMUSCULAR; SOFT TISSUE AS NEEDED
Status: COMPLETED | OUTPATIENT
Start: 2024-11-15 | End: 2024-11-15

## 2024-11-15 ASSESSMENT — COLUMBIA-SUICIDE SEVERITY RATING SCALE - C-SSRS
1. IN THE PAST MONTH, HAVE YOU WISHED YOU WERE DEAD OR WISHED YOU COULD GO TO SLEEP AND NOT WAKE UP?: NO
2. HAVE YOU ACTUALLY HAD ANY THOUGHTS OF KILLING YOURSELF?: NO
6. HAVE YOU EVER DONE ANYTHING, STARTED TO DO ANYTHING, OR PREPARED TO DO ANYTHING TO END YOUR LIFE?: NO

## 2024-11-15 ASSESSMENT — PAIN SCALES - GENERAL
PAINLEVEL_OUTOF10: 7
PAINLEVEL_OUTOF10: 0 - NO PAIN

## 2024-11-15 ASSESSMENT — PAIN - FUNCTIONAL ASSESSMENT
PAIN_FUNCTIONAL_ASSESSMENT: 0-10
PAIN_FUNCTIONAL_ASSESSMENT: 0-10

## 2024-11-15 NOTE — DISCHARGE INSTRUCTIONS
Discharge Instructions for Anesthesiology Pain Service Patients - Dr. Neely    Observe the needle site for excessive bleeding (slow general oozing that completely soaks the dressing or fresh bright red bleeding).  In either case, apply pressure to the area, elevate it if possible and call your doctor at once.    Observe/monitor for the following signs and symptoms:         Needle site:  change in color, numbness or tingling, coldness to touch, swelling, drainage       Temperature of 101.5 or higher       Increased or uncontrollable pain    If you notice any of the above signs or symptoms, please call your doctor at once.    Your pain may not be gone immediately after this procedure; it generally takes 3 to 5 days for the steroid to work.    Keep the needle site clean and dry for 24 hours.    Continue your present medications.    No driving or operating machinery for 24 hours if you have received sedation.    Make an appointment to see you doctor in 2-3 weeks    Central Scheduling Number:  [phone]  Dr. Neely's office:  [phone]  Dr. Neely's Nurse line:  [phone]  After hours Pain Management:  [phone].    If any problems occur, or if you have further questions, please call you doctor as soon as possible.  If you find that you cannot reach your doctor, but feel that your condition needs a doctors attention, go to the  emergency room nearest your home.

## 2024-11-18 NOTE — ADDENDUM NOTE
Encounter addended by: Brielle Raines RN on: 11/18/2024 8:57 AM   Actions taken: Contacts section saved, Flowsheet accepted

## 2024-12-03 ENCOUNTER — APPOINTMENT (OUTPATIENT)
Dept: PAIN MEDICINE | Facility: CLINIC | Age: 79
End: 2024-12-03
Payer: MEDICARE

## 2024-12-03 VITALS — SYSTOLIC BLOOD PRESSURE: 133 MMHG | RESPIRATION RATE: 16 BRPM | DIASTOLIC BLOOD PRESSURE: 86 MMHG | HEART RATE: 78 BPM

## 2024-12-03 DIAGNOSIS — M48.062 SPINAL STENOSIS OF LUMBAR REGION WITH NEUROGENIC CLAUDICATION: Primary | ICD-10-CM

## 2024-12-03 PROCEDURE — 99214 OFFICE O/P EST MOD 30 MIN: CPT | Performed by: PHYSICAL MEDICINE & REHABILITATION

## 2024-12-03 PROCEDURE — 3075F SYST BP GE 130 - 139MM HG: CPT | Performed by: PHYSICAL MEDICINE & REHABILITATION

## 2024-12-03 PROCEDURE — 3079F DIAST BP 80-89 MM HG: CPT | Performed by: PHYSICAL MEDICINE & REHABILITATION

## 2024-12-03 PROCEDURE — 1126F AMNT PAIN NOTED NONE PRSNT: CPT | Performed by: PHYSICAL MEDICINE & REHABILITATION

## 2024-12-03 ASSESSMENT — PAIN SCALES - GENERAL: PAINLEVEL_OUTOF10: 0-NO PAIN

## 2024-12-03 NOTE — PROGRESS NOTES
Subjective   Patient ID: Neo Mckee is a 79 y.o. male who presents for Follow-up.  HPI    79-year-old male with lumbar stenosis with neurogenic claudication following up today after most recent repeat L5-S1 interlaminar epidural steroid injection.  Patient doing very well as usual with greater than 80% relief with significant improvement in his function as he is able to get and go to the gym which does help his pain as well.  He receives these injections about every 3 to 4 months as he continues to get at least 3 months of relief from these injections which he has done so for the last 2+ years.  He would like to keep getting these injections periodically as it continue providing him excellent relief as above.  He would be due for his next injection sometime in February which would work out as he is going to be going to [place] for few months then and would like to have this done right before he leaves.  Last injection was on 11/15/2024.                    Monitoring and compliance:    ORT:    PDUQ:    Office Agreement:      Review of Systems     Current Outpatient Medications   Medication Instructions    aspirin 81 mg capsule 1 tablet, Daily    atorvastatin (LIPITOR) 40 mg, oral, Daily    dilTIAZem CD (CARDIZEM CD) 300 mg, oral, Daily    esomeprazole (NEXIUM) 20 mg, oral, Daily before breakfast    ezetimibe (ZETIA) 10 mg, oral, Daily    fluticasone (Flonase) 50 mcg/actuation nasal spray 1 spray, Each Nostril, Daily    fluticasone propion-salmeteroL (Advair Diskus) 250-50 mcg/dose diskus inhaler 1 puff, inhalation, 2 times daily RT, Rinse mouth with water after use to reduce aftertaste and incidence of candidiasis. Do not swallow.    furosemide (LASIX) 10 mg, oral, Daily, As needed for swelling    multivitamin tablet 1 tablet, Daily RT    olmesartan (BENICAR) 40 mg, oral, Daily    Ventolin HFA 90 mcg/actuation inhaler 2 puffs, inhalation, Every 4 hours PRN        Past Medical History:   Diagnosis Date     Atherosclerotic heart disease of native coronary artery without angina pectoris 10/12/2022    Coronary artery disease    Dysphagia, unspecified 02/25/2015    Food sticks on swallowing    Encounter for screening for malignant neoplasm of colon 08/04/2020    Colon cancer screening    Esophageal obstruction 09/28/2015    Esophageal stricture    Essential (primary) hypertension 10/12/2022    Hypertension    Hyperlipidemia, unspecified 10/12/2022    Hyperlipidemia    Other conditions influencing health status 03/24/2022    History of cough    Other fecal abnormalities 01/28/2021    Positive colorectal cancer screening using Cologuard test    Other specified cardiac arrhythmias 08/12/2021    Fusion beats    Pain in left foot 11/12/2015    Left foot pain    Personal history of other diseases of the circulatory system 08/12/2021    History of paroxysmal supraventricular tachycardia    Personal history of other diseases of the digestive system 04/05/2018    History of esophageal reflux    Personal history of other diseases of the respiratory system 03/24/2022    History of acute bronchitis    Personal history of other diseases of the respiratory system 12/23/2015    History of acute bronchitis    Personal history of other drug therapy 10/11/2018    History of influenza vaccination    Personal history of other mental and behavioral disorders 10/11/2018    History of anxiety    Personal history of other specified conditions 08/30/2021    History of bradycardia    Personal history of pneumonia (recurrent) 04/28/2022    History of pneumonia    Plantar fascial fibromatosis 01/28/2021    Plantar fasciitis of left foot    Ventricular tachycardia, unspecified (Multi) 08/12/2021    Ventricular tachycardia seen on cardiac monitor        Past Surgical History:   Procedure Laterality Date    CORONARY ARTERY BYPASS GRAFT  10/11/2018    CABG    OTHER SURGICAL HISTORY  02/20/2015    Prostatectomy Robotic-Assisted        No family history  on file.     Allergies   Allergen Reactions    Beta-Blockers (Beta-Adrenergic Blocking Agts) Anaphylaxis        No results found for this or any previous visit from the past 1000 days.      Objective     Vitals:    12/03/24 1038   BP: 133/86   Pulse: 78   Resp: 16        Physical Exam    GENERAL EXAM  Vital Signs: Vital signs to include heart rate, respiration rate, blood pressure, and temperature were reviewed.  General Appearance:  Awake, alert, healthy appearing, well developed, No acute distress.  Head: Normocephalic without evidence of head injury.  Neck: The appearance of the neck was normal without swelling with a midline trachea.  Eyes: The eyelids and eyebrows exhibited no abnormalities.  Pupils were not pin-point.  Sclera was without icterus.  Lungs: Respiration rhythm and depth was normal.  Respiratory movements were normal without labored breathing.  Cardiovascular: No peripheral edema was present.    Neurological: Patient was oriented to time, place, and person.  Speech was normal.  Balance, gait, and stance were unremarkable.    Psychiatric: Appearance was normal with appropriate dress.  Mood was euthymic and affect was normal.  Skin: Affected regions were without ecchymosis or skin lesions.        Assessment/Plan   Diagnoses and all orders for this visit:  Spinal stenosis of lumbar region with neurogenic claudication    Very pleasant 79-year-old male with lower back pain with claudication symptoms.  MRI from a couple of years ago showed multilevel degenerative changes with varying degrees of central canal stenosis as well as some foraminal stenosis causing the central canal stenosis in the neurogenic claudication.  Patient is very active with his home exercise program and he does extremely well with greater than 80% relief for least 3 months from L5-S1 interlaminar epidural steroid injections.  Plan:  - Continue home exercise program.  - Patient will likely be put on her schedule for repeat injection  in mid February which would be at the 3+ month tai from his last injection before he goes to [place].  Discussed risks, benefits and alternatives and patient would like to proceed assuming that his pain does return at that point.         This note was generated with the aid of dictation software, there may be typos despite my attempts at proofreading.

## 2025-01-29 ENCOUNTER — PREP FOR PROCEDURE (OUTPATIENT)
Dept: PAIN MEDICINE | Facility: HOSPITAL | Age: 80
End: 2025-01-29
Payer: MEDICARE

## 2025-01-29 ENCOUNTER — TELEPHONE (OUTPATIENT)
Dept: PAIN MEDICINE | Facility: CLINIC | Age: 80
End: 2025-01-29
Payer: MEDICARE

## 2025-01-29 DIAGNOSIS — M48.062 SPINAL STENOSIS OF LUMBAR REGION WITH NEUROGENIC CLAUDICATION: Primary | ICD-10-CM

## 2025-01-29 RX ORDER — DIAZEPAM 5 MG/1
5 TABLET ORAL ONCE AS NEEDED
OUTPATIENT
Start: 2025-01-29

## 2025-01-29 NOTE — TELEPHONE ENCOUNTER
Spoke to Bassem Rafi on the phone. He is interested in a repeat injection. His last injection was L5-S1 Interlaminar epidural steroid injection on 11/15/24. The patient endorses greater than 80% relief of his low back pain along with increase in functional capacity. Previous injection is still providing relief, but he is going to be in [place] until March. He regularly gets these injections as they work extremely well at controlling his pain. Will schedule repeat injection on 3/16/25.

## 2025-01-30 DIAGNOSIS — I25.10 CORONARY ARTERY DISEASE INVOLVING NATIVE CORONARY ARTERY OF NATIVE HEART WITHOUT ANGINA PECTORIS: ICD-10-CM

## 2025-01-30 RX ORDER — DILTIAZEM HYDROCHLORIDE 300 MG/1
300 CAPSULE, COATED, EXTENDED RELEASE ORAL DAILY
Qty: 90 CAPSULE | Refills: 3 | Status: SHIPPED | OUTPATIENT
Start: 2025-01-30 | End: 2026-01-30

## 2025-01-30 NOTE — TELEPHONE ENCOUNTER
dilTIAZem 300MG Refill/ Needs to g over to a new pharm/ [facility] [id number] [facility] [phone]. Jerome would like please done by tomorrow sometimes due to him leaving for out of town over the weekend.

## 2025-03-17 DIAGNOSIS — K44.9 GASTROESOPHAGEAL REFLUX DISEASE WITH HIATAL HERNIA: ICD-10-CM

## 2025-03-17 DIAGNOSIS — K21.9 GASTROESOPHAGEAL REFLUX DISEASE WITH HIATAL HERNIA: ICD-10-CM

## 2025-03-17 DIAGNOSIS — K22.2 SCHATZKI'S RING: ICD-10-CM

## 2025-03-17 DIAGNOSIS — I25.10 CORONARY ARTERY DISEASE INVOLVING NATIVE CORONARY ARTERY OF NATIVE HEART WITHOUT ANGINA PECTORIS: ICD-10-CM

## 2025-03-17 RX ORDER — OLMESARTAN MEDOXOMIL 40 MG/1
40 TABLET ORAL DAILY
Qty: 90 TABLET | Refills: 3 | Status: SHIPPED | OUTPATIENT
Start: 2025-03-17

## 2025-03-17 RX ORDER — EZETIMIBE 10 MG/1
10 TABLET ORAL DAILY
Qty: 90 TABLET | Refills: 3 | Status: SHIPPED | OUTPATIENT
Start: 2025-03-17

## 2025-03-17 RX ORDER — ATORVASTATIN CALCIUM 40 MG/1
40 TABLET, FILM COATED ORAL DAILY
Qty: 90 TABLET | Refills: 3 | Status: SHIPPED | OUTPATIENT
Start: 2025-03-17

## 2025-03-17 RX ORDER — HYDROGEN PEROXIDE 3 %
20 SOLUTION, NON-ORAL MISCELLANEOUS
Qty: 90 CAPSULE | Refills: 3 | Status: SHIPPED | OUTPATIENT
Start: 2025-03-17

## 2025-03-18 ENCOUNTER — TELEPHONE (OUTPATIENT)
Dept: PREOP | Facility: CLINIC | Age: 80
End: 2025-03-18

## 2025-03-19 ENCOUNTER — HOSPITAL ENCOUNTER (OUTPATIENT)
Dept: OPERATING ROOM | Facility: CLINIC | Age: 80
Setting detail: OUTPATIENT SURGERY
Discharge: HOME | End: 2025-03-19
Payer: MEDICARE

## 2025-03-19 VITALS
TEMPERATURE: 97.9 F | WEIGHT: 200.4 LBS | HEIGHT: 70 IN | OXYGEN SATURATION: 95 % | DIASTOLIC BLOOD PRESSURE: 75 MMHG | HEART RATE: 86 BPM | BODY MASS INDEX: 28.69 KG/M2 | SYSTOLIC BLOOD PRESSURE: 152 MMHG | RESPIRATION RATE: 16 BRPM

## 2025-03-19 DIAGNOSIS — M48.062 SPINAL STENOSIS OF LUMBAR REGION WITH NEUROGENIC CLAUDICATION: ICD-10-CM

## 2025-03-19 PROCEDURE — 2550000001 HC RX 255 CONTRASTS: Performed by: PHYSICAL MEDICINE & REHABILITATION

## 2025-03-19 PROCEDURE — 3600000006 HC OR TIME - EACH INCREMENTAL 1 MINUTE - PROCEDURE LEVEL ONE

## 2025-03-19 PROCEDURE — 62323 NJX INTERLAMINAR LMBR/SAC: CPT | Performed by: PHYSICAL MEDICINE & REHABILITATION

## 2025-03-19 PROCEDURE — 2500000004 HC RX 250 GENERAL PHARMACY W/ HCPCS (ALT 636 FOR OP/ED): Performed by: PHYSICAL MEDICINE & REHABILITATION

## 2025-03-19 PROCEDURE — 3600000001 HC OR TIME - INITIAL BASE CHARGE - PROCEDURE LEVEL ONE

## 2025-03-19 PROCEDURE — 7100000009 HC PHASE TWO TIME - INITIAL BASE CHARGE

## 2025-03-19 PROCEDURE — 7100000010 HC PHASE TWO TIME - EACH INCREMENTAL 1 MINUTE

## 2025-03-19 RX ORDER — METHYLPREDNISOLONE ACETATE 40 MG/ML
INJECTION, SUSPENSION INTRA-ARTICULAR; INTRALESIONAL; INTRAMUSCULAR; SOFT TISSUE AS NEEDED
Status: COMPLETED | OUTPATIENT
Start: 2025-03-19 | End: 2025-03-19

## 2025-03-19 RX ORDER — LIDOCAINE HYDROCHLORIDE 5 MG/ML
INJECTION, SOLUTION INFILTRATION; INTRAVENOUS AS NEEDED
Status: COMPLETED | OUTPATIENT
Start: 2025-03-19 | End: 2025-03-19

## 2025-03-19 RX ADMIN — LIDOCAINE HYDROCHLORIDE 5 ML: 5 INJECTION, SOLUTION INFILTRATION at 10:20

## 2025-03-19 RX ADMIN — METHYLPREDNISOLONE ACETATE 40 MG: 40 INJECTION, SUSPENSION INTRA-ARTICULAR; INTRALESIONAL; INTRAMUSCULAR; SOFT TISSUE at 10:20

## 2025-03-19 RX ADMIN — IOHEXOL 3 ML: 240 INJECTION, SOLUTION INTRATHECAL; INTRAVASCULAR; INTRAVENOUS; ORAL at 10:20

## 2025-03-19 ASSESSMENT — PAIN SCALES - GENERAL
PAINLEVEL_OUTOF10: 0 - NO PAIN
PAINLEVEL_OUTOF10: 5 - MODERATE PAIN

## 2025-03-19 ASSESSMENT — PAIN - FUNCTIONAL ASSESSMENT
PAIN_FUNCTIONAL_ASSESSMENT: 0-10
PAIN_FUNCTIONAL_ASSESSMENT: 0-10

## 2025-03-19 ASSESSMENT — COLUMBIA-SUICIDE SEVERITY RATING SCALE - C-SSRS
6. HAVE YOU EVER DONE ANYTHING, STARTED TO DO ANYTHING, OR PREPARED TO DO ANYTHING TO END YOUR LIFE?: NO
2. HAVE YOU ACTUALLY HAD ANY THOUGHTS OF KILLING YOURSELF?: NO
1. IN THE PAST MONTH, HAVE YOU WISHED YOU WERE DEAD OR WISHED YOU COULD GO TO SLEEP AND NOT WAKE UP?: NO

## 2025-03-19 NOTE — H&P
HISTORY AND PHYSICAL    History Of Present Illness  Neo Mckee is a 79 y.o. male presenting with chronic pain.  Here for Lumbar interlaminar epidural steroid injection    he denies any recent antibiotic use or infections, he denies any blood thinner use , and he denies contrast or local anesthetic allergies     Past Medical History  Past Medical History:   Diagnosis Date    Atherosclerotic heart disease of native coronary artery without angina pectoris 10/12/2022    Coronary artery disease    Dysphagia, unspecified 02/25/2015    Food sticks on swallowing    Encounter for screening for malignant neoplasm of colon 08/04/2020    Colon cancer screening    Esophageal obstruction 09/28/2015    Esophageal stricture    Essential (primary) hypertension 10/12/2022    Hypertension    Hyperlipidemia, unspecified 10/12/2022    Hyperlipidemia    Other conditions influencing health status 03/24/2022    History of cough    Other fecal abnormalities 01/28/2021    Positive colorectal cancer screening using Cologuard test    Other specified cardiac arrhythmias 08/12/2021    Fusion beats    Pain in left foot 11/12/2015    Left foot pain    Personal history of other diseases of the circulatory system 08/12/2021    History of paroxysmal supraventricular tachycardia    Personal history of other diseases of the digestive system 04/05/2018    History of esophageal reflux    Personal history of other diseases of the respiratory system 03/24/2022    History of acute bronchitis    Personal history of other diseases of the respiratory system 12/23/2015    History of acute bronchitis    Personal history of other drug therapy 10/11/2018    History of influenza vaccination    Personal history of other mental and behavioral disorders 10/11/2018    History of anxiety    Personal history of other specified conditions 08/30/2021    History of bradycardia    Personal history of pneumonia (recurrent) 04/28/2022    History of pneumonia    Plantar  "fascial fibromatosis 01/28/2021    Plantar fasciitis of left foot    Ventricular tachycardia, unspecified (Multi) 08/12/2021    Ventricular tachycardia seen on cardiac monitor       Surgical History  Past Surgical History:   Procedure Laterality Date    CORONARY ARTERY BYPASS GRAFT  10/11/2018    CABG    OTHER SURGICAL HISTORY  02/20/2015    Prostatectomy Robotic-Assisted        Social History  He reports that he has never smoked. He has never been exposed to tobacco smoke. He has never used smokeless tobacco. He reports current alcohol use of about 2.0 standard drinks of alcohol per week. He reports that he does not use drugs.    Family History  No family history on file.     Allergies  Beta-blockers (beta-adrenergic blocking agts)    Review of Systems   12 point ROS done and negative except for the above.   Physical Exam     General: NAD, well groomed, well nourished  Eyes: Non-icteric sclera, EOMI  Ears, Nose, Mouth, and Throat: External ears and nose appear to be without deformity or rash. No lesions or masses noted. Hearing is grossly intact.   Neck: Trachea midline  Respiratory: Nonlabored breathing   Cardiovascular: No peripheral edema   Skin: No rashes or open lesions/ulcers identified on skin.    Last Recorded Vitals  Blood pressure 157/69, pulse 93, temperature 36.6 °C (97.9 °F), temperature source Temporal, resp. rate 16, height 1.778 m (5' 10\"), weight 90.9 kg (200 lb 6.4 oz).    Relevant Results           Assessment/Plan       Risks, benefits, alternatives discussed. All questions answered to the best of my ability. Patient agrees to proceed.   -We will proceed with planned procedure          "

## 2025-03-19 NOTE — DISCHARGE INSTRUCTIONS
DISCHARGE INSTRUCTIONS FOR INJECTIONS     After most injections, it is recommended that you relax and limit your activity for the remainder of the day unless you have been told otherwise by your pain physician.  You should not drive a car, operate machinery, or make important legal decisions unless otherwise directed by your pain physician.  You may resume your normal activity, including exercise, tomorrow.      Keep a written pain diary of how much pain relief you experienced following the injection procedure and the length of time of pain relief you experienced pain relief. Following diagnostic injections like medial branch nerve blocks, genicular nerve blocks, sacroiliac joint blocks, stellate ganglion injections and other blocks, it is very important you record the specific amount of pain relief you experienced immediately after the injection and how long it lasted. If you have been given Questionnaire paperwork to fill out out after your diagnostic block please call [phone] and leave a detailed voicemail with the answers to the questions you were given. This information is vital to getting approval for next steps.    Observe the needle site for excessive bleeding (slow general oozing that completely soaks the dressing or fresh bright red bleeding).  In either case, apply pressure to the area, elevate it if possible and call your doctor at once.    For all injections, please keep the injection site dry and inspect the site for a couple of days. You may remove the Band-Aid the day of the injection at any time.     Keep the needle site clean & dry for 24 hours.   no soaking baths, hot tubs, whirlpools or swimming pools for 2-3 days.     Some discomfort, bruising or slight swelling may occur at the injection site. This is not abnormal if it occurs.  If needed you may:    -Take over the counter medication such as Tylenol or Motrin.   -Apply an ice pack for 30 minutes, 2 to 3 times a day for the first 24  hours.    If you are given steroids in your injection, your pain may not be gone immediately after this procedure. It generally takes 3-5 days for the steroid to work but it can take up to 2 weeks. may You may notice a worsening of your symptoms for 1-2 days after the injection. This is not abnormal.  You may use acetaminophen, ibuprofen, or prescription medication that your doctor may have prescribed for you if you need to do so.     A few common side effects of steroids include facial flushing, sweating, restlessness, irritability,difficulty sleeping, increase in blood sugar, and increased blood pressure. If you have diabetes, please monitor your blood sugar at least once a day for at least 5 days. If you have poorly controlled high blood pressure, monitoryour blood pressure for at least 2 days and contact your primary care physician if these numbers are unusually high for you.        Call  Pain Management at [phone] between 8am-4pm Monday - Friday if you are experiencing the following:    If you received an epidural or spinal injection:    -Headache that does not go away with medicine, is worse when sitting or standing up, and is greatly relieved upon lying down.   -Severe pain worse than or different than your baseline pain.   -Chills or fever (101º F or greater).   -Drainage or signs of infection at the injection site     Go directly to the Emergency Department if you are experiencing the following and received an epidural or spinal injection:   -Abrupt weakness or progressive weakness in your legs that starts after you leave the clinic.   -Abrupt severe or worsening numbness in your legs.   -Inability to urinate after the injection or loss of bowel or bladder control without the urge to defecate or urinate.     If you have a clinical question that cannot wait until your next appointment, please call [phone] between 8am-4pm Monday - Friday. We do our best to return all non-emergency messages within  24-48 hours, Monday - Friday. A nurse or physician will return your message. You may also try calling and they will do their best to answer your question(s):    Gi Neely's nurse [phone]  After hours pain management [phone]    If you need to cancel an appointment, please call the scheduling staff at [phone] during normal business hours or leave a message at least 24 hours in advance.

## 2025-03-20 ASSESSMENT — PAIN SCALES - GENERAL: PAINLEVEL_OUTOF10: 0 - NO PAIN

## 2025-03-20 NOTE — ADDENDUM NOTE
Encounter addended by: Carmen Pineda RN on: 3/20/2025 8:46 AM   Actions taken: Contacts section saved, Flowsheet accepted

## 2025-04-03 ENCOUNTER — APPOINTMENT (OUTPATIENT)
Dept: PRIMARY CARE | Facility: CLINIC | Age: 80
End: 2025-04-03
Payer: MEDICARE

## 2025-04-03 VITALS
DIASTOLIC BLOOD PRESSURE: 82 MMHG | OXYGEN SATURATION: 94 % | SYSTOLIC BLOOD PRESSURE: 132 MMHG | BODY MASS INDEX: 28.37 KG/M2 | HEART RATE: 95 BPM | WEIGHT: 198.2 LBS | TEMPERATURE: 98.2 F | HEIGHT: 70 IN

## 2025-04-03 DIAGNOSIS — Z00.00 MEDICARE ANNUAL WELLNESS VISIT, SUBSEQUENT: ICD-10-CM

## 2025-04-03 DIAGNOSIS — C61 PROSTATE CANCER (MULTI): Primary | ICD-10-CM

## 2025-04-03 DIAGNOSIS — K22.2 SCHATZKI'S RING: ICD-10-CM

## 2025-04-03 DIAGNOSIS — M48.062 SPINAL STENOSIS OF LUMBAR REGION WITH NEUROGENIC CLAUDICATION: ICD-10-CM

## 2025-04-03 DIAGNOSIS — J45.909 EXTRINSIC ASTHMA, UNSPECIFIED ASTHMA SEVERITY, UNSPECIFIED WHETHER COMPLICATED, UNSPECIFIED WHETHER PERSISTENT (HHS-HCC): ICD-10-CM

## 2025-04-03 DIAGNOSIS — D12.6 TUBULAR ADENOMA OF COLON: ICD-10-CM

## 2025-04-03 DIAGNOSIS — E78.5 HYPERLIPIDEMIA, UNSPECIFIED HYPERLIPIDEMIA TYPE: ICD-10-CM

## 2025-04-03 DIAGNOSIS — M54.10 RADICULAR SYNDROME OF RIGHT LEG: ICD-10-CM

## 2025-04-03 DIAGNOSIS — K44.9 GASTROESOPHAGEAL REFLUX DISEASE WITH HIATAL HERNIA: ICD-10-CM

## 2025-04-03 DIAGNOSIS — J30.9 ALLERGIC RHINITIS, UNSPECIFIED SEASONALITY, UNSPECIFIED TRIGGER: ICD-10-CM

## 2025-04-03 DIAGNOSIS — M54.41 CHRONIC LOW BACK PAIN WITH RIGHT-SIDED SCIATICA, UNSPECIFIED BACK PAIN LATERALITY: ICD-10-CM

## 2025-04-03 DIAGNOSIS — K21.9 GASTROESOPHAGEAL REFLUX DISEASE WITH HIATAL HERNIA: ICD-10-CM

## 2025-04-03 DIAGNOSIS — I10 HYPERTENSION, UNSPECIFIED TYPE: ICD-10-CM

## 2025-04-03 DIAGNOSIS — G89.29 CHRONIC LOW BACK PAIN WITH RIGHT-SIDED SCIATICA, UNSPECIFIED BACK PAIN LATERALITY: ICD-10-CM

## 2025-04-03 DIAGNOSIS — I25.10 CORONARY ARTERY DISEASE INVOLVING NATIVE CORONARY ARTERY OF NATIVE HEART WITHOUT ANGINA PECTORIS: ICD-10-CM

## 2025-04-03 PROCEDURE — 1160F RVW MEDS BY RX/DR IN RCRD: CPT | Performed by: INTERNAL MEDICINE

## 2025-04-03 PROCEDURE — 1159F MED LIST DOCD IN RCRD: CPT | Performed by: INTERNAL MEDICINE

## 2025-04-03 PROCEDURE — 1170F FXNL STATUS ASSESSED: CPT | Performed by: INTERNAL MEDICINE

## 2025-04-03 PROCEDURE — G0439 PPPS, SUBSEQ VISIT: HCPCS | Performed by: INTERNAL MEDICINE

## 2025-04-03 PROCEDURE — 3075F SYST BP GE 130 - 139MM HG: CPT | Performed by: INTERNAL MEDICINE

## 2025-04-03 PROCEDURE — G2211 COMPLEX E/M VISIT ADD ON: HCPCS | Performed by: INTERNAL MEDICINE

## 2025-04-03 PROCEDURE — 3079F DIAST BP 80-89 MM HG: CPT | Performed by: INTERNAL MEDICINE

## 2025-04-03 PROCEDURE — 1036F TOBACCO NON-USER: CPT | Performed by: INTERNAL MEDICINE

## 2025-04-03 PROCEDURE — 1126F AMNT PAIN NOTED NONE PRSNT: CPT | Performed by: INTERNAL MEDICINE

## 2025-04-03 RX ORDER — DILTIAZEM HYDROCHLORIDE 300 MG/1
300 CAPSULE, COATED, EXTENDED RELEASE ORAL DAILY
Qty: 90 CAPSULE | Refills: 3 | Status: SHIPPED | OUTPATIENT
Start: 2025-04-03 | End: 2026-04-03

## 2025-04-03 RX ORDER — OLMESARTAN MEDOXOMIL 40 MG/1
40 TABLET ORAL DAILY
Qty: 90 TABLET | Refills: 3 | Status: SHIPPED | OUTPATIENT
Start: 2025-04-03

## 2025-04-03 RX ORDER — FUROSEMIDE 20 MG/1
10 TABLET ORAL DAILY
Qty: 30 TABLET | Refills: 3 | Status: SHIPPED | OUTPATIENT
Start: 2025-04-03

## 2025-04-03 RX ORDER — EZETIMIBE 10 MG/1
10 TABLET ORAL DAILY
Qty: 90 TABLET | Refills: 3 | Status: SHIPPED | OUTPATIENT
Start: 2025-04-03

## 2025-04-03 RX ORDER — FLUTICASONE PROPIONATE AND SALMETEROL 250; 50 UG/1; UG/1
1 POWDER RESPIRATORY (INHALATION)
Qty: 60 EACH | Refills: 11 | Status: SHIPPED | OUTPATIENT
Start: 2025-04-03 | End: 2026-04-03

## 2025-04-03 RX ORDER — HYDROGEN PEROXIDE 3 %
20 SOLUTION, NON-ORAL MISCELLANEOUS
Qty: 90 CAPSULE | Refills: 3 | Status: SHIPPED | OUTPATIENT
Start: 2025-04-03

## 2025-04-03 RX ORDER — ALBUTEROL SULFATE 90 UG/1
2 AEROSOL, METERED RESPIRATORY (INHALATION) EVERY 4 HOURS PRN
Qty: 18 G | Refills: 11 | Status: SHIPPED | OUTPATIENT
Start: 2025-04-03

## 2025-04-03 RX ORDER — FLUTICASONE PROPIONATE 50 MCG
1 SPRAY, SUSPENSION (ML) NASAL DAILY
Qty: 16 G | Refills: 11 | Status: SHIPPED | OUTPATIENT
Start: 2025-04-03 | End: 2025-05-03

## 2025-04-03 RX ORDER — ATORVASTATIN CALCIUM 40 MG/1
40 TABLET, FILM COATED ORAL DAILY
Qty: 90 TABLET | Refills: 3 | Status: SHIPPED | OUTPATIENT
Start: 2025-04-03

## 2025-04-03 ASSESSMENT — ACTIVITIES OF DAILY LIVING (ADL)
TAKING_MEDICATION: INDEPENDENT
DOING_HOUSEWORK: INDEPENDENT
DRESSING: INDEPENDENT
MANAGING_FINANCES: INDEPENDENT
BATHING: INDEPENDENT
GROCERY_SHOPPING: INDEPENDENT

## 2025-04-03 ASSESSMENT — PATIENT HEALTH QUESTIONNAIRE - PHQ9
SUM OF ALL RESPONSES TO PHQ9 QUESTIONS 1 AND 2: 0
1. LITTLE INTEREST OR PLEASURE IN DOING THINGS: NOT AT ALL
2. FEELING DOWN, DEPRESSED OR HOPELESS: NOT AT ALL

## 2025-04-03 ASSESSMENT — ENCOUNTER SYMPTOMS
OCCASIONAL FEELINGS OF UNSTEADINESS: 0
DEPRESSION: 0
LOSS OF SENSATION IN FEET: 0

## 2025-04-03 ASSESSMENT — PAIN SCALES - GENERAL: PAINLEVEL_OUTOF10: 0-NO PAIN

## 2025-04-03 NOTE — PATIENT INSTRUCTIONS
Good to see you today  On exam today you have some nasal fullnes suggestive of allergies,  your heart sounds good, the lungs are clear and your knees joints are fluid.    Blood work ordered today  Colonoscopy ordered  Names of future PCPs. Doctors who trained in our residency: Tex Mccarthy in [place], Jimmie Solomon and Sanchez Meza in [place] and Anil Liao at [facility] (practice closed) but Darryl Choudhury is also in the practice and has a good reputation. Consider the [facility] practice given proximity.   Other family practice docs who I heard of as good practioners are Padmini Smith ([address]) and Nidia Taylor on [address] .

## 2025-04-03 NOTE — PROGRESS NOTES
"Subjective   Patient ID:   [id number]   [id number]   Neo Mckee is a 79 y.o. male who presents for Follow-up and Annual Exam.  HPI    79 year old male here for annual exam.  He had a great time in [place].  When they went to [facility] they went to the beach every day and worked out.  He and his partner both got back on track.  His partner had cataract surgery today.  She still has hip pain.  He is really happy with the pain management doctor who injects his spine.  He does it every 3 months based on when the pain recurs and his insurance coverage has been good thus far. It takes the edge off and works best in conjunction with working out. He uses his hot water bottle.  His grandson Eduar is going to [employer] for running.  He outruns his electric bike. Severo is looking at [employer] [facility] for engineering.    ROS were reviewed and are negative with the exception of what is noted in HPI    /82 (BP Location: Right arm, Patient Position: Sitting, BP Cuff Size: Large adult)   Pulse 95   Temp 36.8 °C (98.2 °F) (Oral)   Ht 1.778 m (5' 10\")   Wt 89.9 kg (198 lb 3.2 oz)   SpO2 94%   BMI 28.44 kg/m²   Objective   Physical Exam  Vitals reviewed.   Constitutional:       General: He is not in acute distress.  HENT:      Head: Normocephalic.      Right Ear: Tympanic membrane normal.      Left Ear: Tympanic membrane normal.      Nose:      Comments: Nasal turbinates full bilaterally     Mouth/Throat:      Mouth: Mucous membranes are moist.   Eyes:      Extraocular Movements: Extraocular movements intact.      Pupils: Pupils are equal, round, and reactive to light.   Cardiovascular:      Rate and Rhythm: Normal rate and regular rhythm.      Heart sounds: No murmur heard.     No friction rub. No gallop.   Pulmonary:      Effort: Pulmonary effort is normal.      Breath sounds: No wheezing, rhonchi or rales.   Abdominal:      Palpations: Abdomen is soft.      Tenderness: There is no abdominal tenderness. There is no " guarding.   Musculoskeletal:         General: No swelling.      Cervical back: Neck supple.   Lymphadenopathy:      Cervical: No cervical adenopathy.   Neurological:      Mental Status: He is alert.         Assessment/Plan   Problem List Items Addressed This Visit       Hypertension     #allergic rhinitis, allergic conjunctivitis, allergic asthma - not on the montelukast but his season may be restarting. Encouraged flonase to start.  Refilled his advair and he also has the albuterol.  To consider future referral to allergist, ENT (Kevin) and PFTs but doing okay.  His season is just starting.  H  #. Arthritis, back, leg pain - loves his pain doctor and continues to feel he has has dramatically helped him with this issue.  Spinal central canal and foraminal stenosis L2 through L5 on MRI 10/21.  He is thoughtful about his role in the care of his spine and how his diet, exercise impact his pain. Ongoing care from his chiropracter, massyenni and the pain doctor for epidural steroids.  #. Bradycardia, SVT and ventricular tachycardia (2 runs of 6 and 7 beats each) - bradycardia seems to have resolved.  EPS consult 2021 and cardiology is following. EP felt no indication to consider intervention with low burden of disease and asymptomatic.   #. Hypertension - better keeping his weight down and not working. Room to increase his diltiazem.  His home pressures are better than here today.  HE will continue to check.    #. CAD, CABG 1998 (LIMA to LAD, LRA to posterior descending artery), hyperlipidemia - Appreciate [facility] input, stress ECHO October 2018 with normal EF, no wall motion abnormalities, no evidence of ischemia. On statin, aspirin, ARB  #. Ankle edema - suspect venous insufficiency.  Last ECHO 2021 with normal EF.   #. Prostate Ca, erectile dysfunction - post prostatecomy July 2013. Uses cialis or viagra but lately feels like has heart burn with the medication so less likely to use. No longer follows with  urology at [facility] repeat PSA ordered today  #. GERD - on PPI for lifetime per DUMOT   #. Schatzki ring - successful second dilatation per Dumot x2.  #. NEvi - derm follow up  #. Macrocytosis - Drinks wine with meals 5 times a week  #. Tremor - has strong family history of tremor, cut back caffeine and less of an issue lately.   #. Health Maintenance   - Full physical exam March 2025.    - Immunizations current. Should have TdaP, RSV at pharmacy.  Otherwise up to date  - colonoscopy 2020, repeat due 2025 as history of tubular adenoma with last one Repeat ordered  - reviewed medicare wellness questions and discussed consultant input, personal support systems      Gilma Partida MD

## 2025-04-04 LAB
ALBUMIN SERPL-MCNC: 4.7 G/DL (ref 3.6–5.1)
ALP SERPL-CCNC: 64 U/L (ref 35–144)
ALT SERPL-CCNC: 25 U/L (ref 9–46)
ANION GAP SERPL CALCULATED.4IONS-SCNC: 15 MMOL/L (CALC) (ref 7–17)
AST SERPL-CCNC: 22 U/L (ref 10–35)
BASOPHILS # BLD AUTO: 30 CELLS/UL (ref 0–200)
BASOPHILS NFR BLD AUTO: 0.4 %
BILIRUB SERPL-MCNC: 1.5 MG/DL (ref 0.2–1.2)
BUN SERPL-MCNC: 17 MG/DL (ref 7–25)
CALCIUM SERPL-MCNC: 9.2 MG/DL (ref 8.6–10.3)
CHLORIDE SERPL-SCNC: 103 MMOL/L (ref 98–110)
CHOLEST SERPL-MCNC: 154 MG/DL
CHOLEST/HDLC SERPL: 2.1 (CALC)
CO2 SERPL-SCNC: 23 MMOL/L (ref 20–32)
CREAT SERPL-MCNC: 0.91 MG/DL (ref 0.7–1.28)
EGFRCR SERPLBLD CKD-EPI 2021: 86 ML/MIN/1.73M2
EOSINOPHIL # BLD AUTO: 83 CELLS/UL (ref 15–500)
EOSINOPHIL NFR BLD AUTO: 1.1 %
ERYTHROCYTE [DISTWIDTH] IN BLOOD BY AUTOMATED COUNT: 12.5 % (ref 11–15)
GLUCOSE SERPL-MCNC: 81 MG/DL (ref 65–99)
HCT VFR BLD AUTO: 47.8 % (ref 38.5–50)
HDLC SERPL-MCNC: 73 MG/DL
HGB BLD-MCNC: 16.1 G/DL (ref 13.2–17.1)
LDLC SERPL CALC-MCNC: 65 MG/DL (CALC)
LYMPHOCYTES # BLD AUTO: 1673 CELLS/UL (ref 850–3900)
LYMPHOCYTES NFR BLD AUTO: 22.3 %
MCH RBC QN AUTO: 33.2 PG (ref 27–33)
MCHC RBC AUTO-ENTMCNC: 33.7 G/DL (ref 32–36)
MCV RBC AUTO: 98.6 FL (ref 80–100)
MONOCYTES # BLD AUTO: 480 CELLS/UL (ref 200–950)
MONOCYTES NFR BLD AUTO: 6.4 %
NEUTROPHILS # BLD AUTO: 5235 CELLS/UL (ref 1500–7800)
NEUTROPHILS NFR BLD AUTO: 69.8 %
NONHDLC SERPL-MCNC: 81 MG/DL (CALC)
PLATELET # BLD AUTO: 221 THOUSAND/UL (ref 140–400)
PMV BLD REES-ECKER: 11.4 FL (ref 7.5–12.5)
POTASSIUM SERPL-SCNC: 4.1 MMOL/L (ref 3.5–5.3)
PROT SERPL-MCNC: 7.2 G/DL (ref 6.1–8.1)
PSA SERPL-MCNC: <0.04 NG/ML
RBC # BLD AUTO: 4.85 MILLION/UL (ref 4.2–5.8)
SODIUM SERPL-SCNC: 141 MMOL/L (ref 135–146)
TRIGL SERPL-MCNC: 75 MG/DL
WBC # BLD AUTO: 7.5 THOUSAND/UL (ref 3.8–10.8)

## 2025-05-07 ENCOUNTER — OFFICE VISIT (OUTPATIENT)
Dept: CARDIOLOGY | Facility: CLINIC | Age: 80
End: 2025-05-07
Payer: MEDICARE

## 2025-05-07 VITALS
SYSTOLIC BLOOD PRESSURE: 129 MMHG | OXYGEN SATURATION: 93 % | HEIGHT: 70 IN | BODY MASS INDEX: 28.35 KG/M2 | WEIGHT: 198 LBS | DIASTOLIC BLOOD PRESSURE: 85 MMHG | HEART RATE: 106 BPM

## 2025-05-07 DIAGNOSIS — I10 HYPERTENSION, UNSPECIFIED TYPE: ICD-10-CM

## 2025-05-07 DIAGNOSIS — E78.5 HYPERLIPIDEMIA, UNSPECIFIED HYPERLIPIDEMIA TYPE: ICD-10-CM

## 2025-05-07 DIAGNOSIS — I25.10 CORONARY ARTERY DISEASE INVOLVING NATIVE CORONARY ARTERY OF NATIVE HEART WITHOUT ANGINA PECTORIS: Primary | ICD-10-CM

## 2025-05-07 PROCEDURE — 99214 OFFICE O/P EST MOD 30 MIN: CPT | Performed by: NURSE PRACTITIONER

## 2025-05-07 PROCEDURE — 1159F MED LIST DOCD IN RCRD: CPT | Performed by: NURSE PRACTITIONER

## 2025-05-07 PROCEDURE — 1036F TOBACCO NON-USER: CPT | Performed by: NURSE PRACTITIONER

## 2025-05-07 PROCEDURE — 3079F DIAST BP 80-89 MM HG: CPT | Performed by: NURSE PRACTITIONER

## 2025-05-07 PROCEDURE — 93005 ELECTROCARDIOGRAM TRACING: CPT | Performed by: NURSE PRACTITIONER

## 2025-05-07 PROCEDURE — 1160F RVW MEDS BY RX/DR IN RCRD: CPT | Performed by: NURSE PRACTITIONER

## 2025-05-07 PROCEDURE — 1126F AMNT PAIN NOTED NONE PRSNT: CPT | Performed by: NURSE PRACTITIONER

## 2025-05-07 PROCEDURE — 3074F SYST BP LT 130 MM HG: CPT | Performed by: NURSE PRACTITIONER

## 2025-05-07 ASSESSMENT — ENCOUNTER SYMPTOMS: OCCASIONAL FEELINGS OF UNSTEADINESS: 0

## 2025-05-07 ASSESSMENT — PAIN SCALES - GENERAL: PAINLEVEL_OUTOF10: 0-NO PAIN

## 2025-05-07 NOTE — PROGRESS NOTES
Subjective   Neo Mckee is a 79 y.o. male.    Chief Complaint:  Hypertension, Hyperlipidemia, and Coronary Artery Disease    Mr. Mckee returns for a routine 6 month follow up. He has been feeling well from a cardiac standpoint. He has remained compliant with his medications, denying any intolerances. He remains active walking at the [facility] center, denying any exertional chest pain or shortness of breath. His biggest issue and limiting factor continues to be back pain, though he doesn't let this stop him. He denies any recent ER visits or hospitalizations. He offers no specific cardiovascular complaints or concerns today. He denies any complaints of chest pain, shortness of breath, lightheadedness, dizziness, palpitations, syncope, orthopnea, paroxysmal nocturnal dyspnea, lower extremity swelling or bleeding concerns.         Review of Systems   All other systems reviewed and are negative.      Objective   Physical Exam  Constitutional:       Appearance: Healthy appearance. In no distress  Pulmonary:      Effort: Pulmonary effort is normal.      Breath sounds: Normal breath sounds.   Cardiovascular:      Normal rate. Regular rhythm. Normal S1. Normal S2.       Murmurs: There is no murmur.      Carotids: right carotid pulse +2, no bruit heard over the right carotid. left carotid pulse +2, no bruit heard over the left carotid.  Edema:     Peripheral edema absent.   Abdominal:      Palpations: Abdomen is soft.   Musculoskeletal:       Cervical back: Normal range of motion.   Skin:     General: Skin is warm and dry. Normal color and pigmentation   Neurological:      Mental Status: Alert and oriented to person, place and time.   Psychiatric:     Mood and Affect: appropriate mood and appropriate affect.     EKG obtained and reviewed. Sinus tachycardia. Inferior infarct, age undetermined.       Lab Review:   Lab Results   Component Value Date     04/03/2025    K 4.1 04/03/2025     04/03/2025    CO2 23  04/03/2025    BUN 17 04/03/2025    CREATININE 0.91 04/03/2025    GLUCOSE 81 04/03/2025    CALCIUM 9.2 04/03/2025     Lab Results   Component Value Date    WBC 7.5 04/03/2025    HGB 16.1 04/03/2025    HCT 47.8 04/03/2025    MCV 98.6 04/03/2025     04/03/2025     Lab Results   Component Value Date    CHOL 154 04/03/2025    TRIG 75 04/03/2025    HDL 73 04/03/2025       Assessment/Plan   Mr. Mckee is a pleasant 79 year old  male with a past medical history significant for hypertension, hyperlipidemia, back pain followed by pain management and CAD s/p remote 2 vessel CABG in 1998. Stress-echo in 2018 showed no evidence of ischemia with preserved LV function. Echocardiogram 8/2021 showed an EF of 55-60% with mild MR. He presents today for routine follow up stable from a cardiac standpoint. His VS and EKG remain stable. His HR is a little elevated today, though he has not had his diltiazem yet, and he just came from exercising. He remains on appropriate antiplatelet and lipid lowering therapy with his LDL at goal. He struggles with back/sciatica pain, though denies any chest pain or shortness of breath at his current activity level. I will have him continue all medications unchanged. We will not embark on any additional cardiovascular testing at this time. He will follow up with me in clinic in 6 months. He knows to call for any concerns.

## 2025-05-12 LAB
ATRIAL RATE: 107 BPM
P AXIS: 77 DEGREES
P OFFSET: 178 MS
P ONSET: 126 MS
PR INTERVAL: 142 MS
Q ONSET: 197 MS
QRS COUNT: 17 BEATS
QRS DURATION: 118 MS
QT INTERVAL: 362 MS
QTC CALCULATION(BAZETT): 483 MS
QTC FREDERICIA: 439 MS
R AXIS: 63 DEGREES
T AXIS: -1 DEGREES
T OFFSET: 378 MS
VENTRICULAR RATE: 107 BPM

## 2025-06-25 DIAGNOSIS — Z86.0101 HX OF ADENOMATOUS POLYP OF COLON: Primary | ICD-10-CM

## 2025-06-25 RX ORDER — SODIUM, POTASSIUM,MAG SULFATES 17.5-3.13G
SOLUTION, RECONSTITUTED, ORAL ORAL
Qty: 354 ML | Refills: 0 | Status: SHIPPED | OUTPATIENT
Start: 2025-06-25

## 2025-07-08 ENCOUNTER — HOSPITAL ENCOUNTER (OUTPATIENT)
Dept: GASTROENTEROLOGY | Facility: HOSPITAL | Age: 80
Discharge: HOME | End: 2025-07-08
Payer: MEDICARE

## 2025-07-08 VITALS
SYSTOLIC BLOOD PRESSURE: 122 MMHG | HEART RATE: 64 BPM | BODY MASS INDEX: 27.46 KG/M2 | OXYGEN SATURATION: 97 % | RESPIRATION RATE: 16 BRPM | HEIGHT: 70 IN | WEIGHT: 191.8 LBS | TEMPERATURE: 97.5 F | DIASTOLIC BLOOD PRESSURE: 63 MMHG

## 2025-07-08 DIAGNOSIS — D12.6 TUBULAR ADENOMA OF COLON: Primary | ICD-10-CM

## 2025-07-08 PROCEDURE — 2500000004 HC RX 250 GENERAL PHARMACY W/ HCPCS (ALT 636 FOR OP/ED): Performed by: INTERNAL MEDICINE

## 2025-07-08 PROCEDURE — 7100000009 HC PHASE TWO TIME - INITIAL BASE CHARGE

## 2025-07-08 PROCEDURE — 2720000007 HC OR 272 NO HCPCS

## 2025-07-08 PROCEDURE — 45380 COLONOSCOPY AND BIOPSY: CPT | Performed by: INTERNAL MEDICINE

## 2025-07-08 PROCEDURE — 45385 COLONOSCOPY W/LESION REMOVAL: CPT | Performed by: INTERNAL MEDICINE

## 2025-07-08 PROCEDURE — G0500 MOD SEDAT ENDO SERVICE >5YRS: HCPCS | Performed by: INTERNAL MEDICINE

## 2025-07-08 PROCEDURE — 3700000013 HC SEDATION LEVEL 5+ TIME - EACH ADDITIONAL 15 MINUTES

## 2025-07-08 PROCEDURE — 3700000012 HC SEDATION LEVEL 5+ TIME - INITIAL 15 MINUTES 5/> YEARS

## 2025-07-08 PROCEDURE — 7100000010 HC PHASE TWO TIME - EACH INCREMENTAL 1 MINUTE

## 2025-07-08 RX ORDER — FENTANYL CITRATE 50 UG/ML
INJECTION, SOLUTION INTRAMUSCULAR; INTRAVENOUS AS NEEDED
Status: COMPLETED | OUTPATIENT
Start: 2025-07-08 | End: 2025-07-08

## 2025-07-08 RX ORDER — MIDAZOLAM HYDROCHLORIDE 1 MG/ML
INJECTION, SOLUTION INTRAMUSCULAR; INTRAVENOUS AS NEEDED
Status: COMPLETED | OUTPATIENT
Start: 2025-07-08 | End: 2025-07-08

## 2025-07-08 RX ADMIN — MIDAZOLAM HYDROCHLORIDE 2 MG: 1 INJECTION, SOLUTION INTRAMUSCULAR; INTRAVENOUS at 11:33

## 2025-07-08 RX ADMIN — FENTANYL CITRATE 50 MCG: 50 INJECTION, SOLUTION INTRAMUSCULAR; INTRAVENOUS at 11:33

## 2025-07-08 RX ADMIN — FENTANYL CITRATE 50 MCG: 50 INJECTION, SOLUTION INTRAMUSCULAR; INTRAVENOUS at 11:30

## 2025-07-08 RX ADMIN — MIDAZOLAM HYDROCHLORIDE 2 MG: 1 INJECTION, SOLUTION INTRAMUSCULAR; INTRAVENOUS at 11:30

## 2025-07-08 ASSESSMENT — PAIN - FUNCTIONAL ASSESSMENT
PAIN_FUNCTIONAL_ASSESSMENT: 0-10

## 2025-07-08 ASSESSMENT — PAIN SCALES - GENERAL

## 2025-07-08 ASSESSMENT — COLUMBIA-SUICIDE SEVERITY RATING SCALE - C-SSRS
1. IN THE PAST MONTH, HAVE YOU WISHED YOU WERE DEAD OR WISHED YOU COULD GO TO SLEEP AND NOT WAKE UP?: NO
6. HAVE YOU EVER DONE ANYTHING, STARTED TO DO ANYTHING, OR PREPARED TO DO ANYTHING TO END YOUR LIFE?: NO
2. HAVE YOU ACTUALLY HAD ANY THOUGHTS OF KILLING YOURSELF?: NO

## 2025-07-08 NOTE — NURSING NOTE
1252- Pt assisted with dressing with help of family. IV removed dressing applied. Discharge instructions provided to pt and family. Educated on medications, effects of anesthesia, and homecoming care. Pt and family verbalizing understanding of all instructions provided at this time. All questions and concerns answered. Pt given contact information for provider.     0019- Pt assisted to main lobby via  by transport. Dc in stable condition to home. All belongings taken with pt.

## 2025-07-08 NOTE — H&P
"History Of Present Illness  Neo Mckee is a 79 y.o. male presenting with surveillance of previous adenomatous polyp 2020     Past Medical History  Medical History[1]  Surgical History  Surgical History[2]  Social History  He reports that he has never smoked. He has never been exposed to tobacco smoke. He has never used smokeless tobacco. He reports current alcohol use of about 2.0 standard drinks of alcohol per week. He reports that he does not use drugs.    Family History  Family History[3]     Allergies  Allergies[4]  Review of Systems  Pre-sedation Evaluation:  ASA Classification - ASA 3 - Patient with moderate systemic disease with functional limitations  Mallampati Score - III (soft and hard palate and base of uvula visible)    Physical Exam  Cardiovascular:      Rate and Rhythm: Normal rate and regular rhythm.   Pulmonary:      Breath sounds: Normal breath sounds. No wheezing.   Abdominal:      Palpations: Abdomen is soft.      Tenderness: There is no abdominal tenderness.          Last Recorded Vitals  Blood pressure 149/74, pulse 93, temperature 36.5 °C (97.7 °F), temperature source Temporal, resp. rate 18, height 1.778 m (5' 10\"), weight 87 kg (191 lb 12.8 oz), SpO2 95%.     Assessment/Plan   Colonoscopy with moderate sedation for surveillance     PTA/Current Medications:  Prescriptions Prior to Admission[5]  Current Medications[6]  Allison Flores MD       [1]   Past Medical History:  Diagnosis Date    Anxiety 2013    Atherosclerotic heart disease of native coronary artery without angina pectoris 10/12/2022    Coronary artery disease    CHF (congestive heart failure) 1996    Dysphagia, unspecified 02/25/2015    Food sticks on swallowing    Encounter for screening for malignant neoplasm of colon 08/04/2020    Colon cancer screening    Esophageal obstruction 09/28/2015    Esophageal stricture    Essential (primary) hypertension 10/12/2022    Hypertension    GERD (gastroesophageal reflux disease) 1996    " Hyperlipidemia, unspecified 10/12/2022    Hyperlipidemia    Myocardial infarction (Multi) 1996    Other conditions influencing health status 03/24/2022    History of cough    Other fecal abnormalities 01/28/2021    Positive colorectal cancer screening using Cologuard test    Other specified cardiac arrhythmias 08/12/2021    Fusion beats    Pain in left foot 11/12/2015    Left foot pain    Personal history of other diseases of the circulatory system 08/12/2021    History of paroxysmal supraventricular tachycardia    Personal history of other diseases of the digestive system 04/05/2018    History of esophageal reflux    Personal history of other diseases of the respiratory system 03/24/2022    History of acute bronchitis    Personal history of other diseases of the respiratory system 12/23/2015    History of acute bronchitis    Personal history of other drug therapy 10/11/2018    History of influenza vaccination    Personal history of other mental and behavioral disorders 10/11/2018    History of anxiety    Personal history of other specified conditions 08/30/2021    History of bradycardia    Personal history of pneumonia (recurrent) 04/28/2022    History of pneumonia    Plantar fascial fibromatosis 01/28/2021    Plantar fasciitis of left foot    Spinal stenosis of lumbar region with neurogenic claudication     Ventricular tachycardia, unspecified (Multi) 08/12/2021    Ventricular tachycardia seen on cardiac monitor   [2]   Past Surgical History:  Procedure Laterality Date    CORONARY ARTERY BYPASS GRAFT  10/11/2018    CABG    OTHER SURGICAL HISTORY  02/20/2015    Prostatectomy Robotic-Assisted   [3] No family history on file.  [4]   Allergies  Allergen Reactions    Beta-Blockers (Beta-Adrenergic Blocking Agts) Anaphylaxis   [5] (Not in a hospital admission)  [6]   Current Outpatient Medications   Medication Sig Dispense Refill    aspirin 81 mg capsule Take 1 tablet by mouth once daily.      atorvastatin (Lipitor) 40  mg tablet Take 1 tablet (40 mg) by mouth once daily. 90 tablet 3    dilTIAZem CD (Cardizem CD) 300 mg 24 hr capsule Take 1 capsule (300 mg) by mouth once daily. 90 capsule 3    esomeprazole (NexIUM) 20 mg DR capsule Take 1 capsule (20 mg) by mouth once daily in the morning. Take before meals. 90 capsule 3    ezetimibe (Zetia) 10 mg tablet Take 1 tablet (10 mg) by mouth once daily. 90 tablet 3    furosemide (Lasix) 20 mg tablet Take 0.5 tablets (10 mg) by mouth once daily. As needed for swelling (Patient taking differently: Take 0.5 tablets (10 mg) by mouth once daily as needed. As needed for swelling) 30 tablet 3    multivitamin tablet Take 1 tablet by mouth once daily.      NON FORMULARY Take by mouth once daily. SciatiEase      NON FORMULARY Take by mouth once daily. Bio Complete 3      olmesartan (BENIcar) 40 mg tablet Take 1 tablet (40 mg) by mouth once daily. 90 tablet 3    sodium,potassium,mag sulfates (Suprep) 17.5-3.13-1.6 gram solution Take one bottle twice as directed by the prep instructions 354 mL 0    Ventolin HFA 90 mcg/actuation inhaler Inhale 2 puffs every 4 hours if needed for shortness of breath (cough). 18 g 11    fluticasone (Flonase) 50 mcg/actuation nasal spray Administer 1 spray into each nostril once daily. (Patient taking differently: Administer 1 spray into each nostril once daily as needed.) 16 g 11    fluticasone propion-salmeteroL (Advair Diskus) 250-50 mcg/dose diskus inhaler Inhale 1 puff 2 times a day. Rinse mouth with water after use to reduce aftertaste and incidence of candidiasis. Do not swallow. (Patient taking differently: Inhale 1 puff 2 times a day as needed. Rinse mouth with water after use to reduce aftertaste and incidence of candidiasis. Do not swallow.) 60 each 11     No current facility-administered medications for this encounter.

## 2025-07-16 LAB
LABORATORY COMMENT REPORT: NORMAL
PATH REPORT.FINAL DX SPEC: NORMAL
PATH REPORT.GROSS SPEC: NORMAL
PATH REPORT.RELEVANT HX SPEC: NORMAL
PATH REPORT.TOTAL CANCER: NORMAL

## 2025-07-28 ENCOUNTER — OFFICE VISIT (OUTPATIENT)
Dept: PAIN MEDICINE | Facility: HOSPITAL | Age: 80
End: 2025-07-28
Payer: MEDICARE

## 2025-07-28 VITALS
SYSTOLIC BLOOD PRESSURE: 154 MMHG | DIASTOLIC BLOOD PRESSURE: 88 MMHG | RESPIRATION RATE: 16 BRPM | HEART RATE: 76 BPM | WEIGHT: 191 LBS | HEIGHT: 70 IN | BODY MASS INDEX: 27.35 KG/M2 | OXYGEN SATURATION: 96 %

## 2025-07-28 DIAGNOSIS — M48.062 SPINAL STENOSIS OF LUMBAR REGION WITH NEUROGENIC CLAUDICATION: Primary | ICD-10-CM

## 2025-07-28 PROCEDURE — G2211 COMPLEX E/M VISIT ADD ON: HCPCS | Performed by: PHYSICAL MEDICINE & REHABILITATION

## 2025-07-28 PROCEDURE — 1159F MED LIST DOCD IN RCRD: CPT | Performed by: PHYSICAL MEDICINE & REHABILITATION

## 2025-07-28 PROCEDURE — 99214 OFFICE O/P EST MOD 30 MIN: CPT | Performed by: PHYSICAL MEDICINE & REHABILITATION

## 2025-07-28 PROCEDURE — 3077F SYST BP >= 140 MM HG: CPT | Performed by: PHYSICAL MEDICINE & REHABILITATION

## 2025-07-28 PROCEDURE — 3079F DIAST BP 80-89 MM HG: CPT | Performed by: PHYSICAL MEDICINE & REHABILITATION

## 2025-07-28 RX ORDER — DIAZEPAM 5 MG/1
5 TABLET ORAL ONCE AS NEEDED
OUTPATIENT
Start: 2025-07-28

## 2025-07-28 SDOH — SOCIAL STABILITY: SOCIAL NETWORK: SOCIAL ACTIVITY:: 3

## 2025-07-28 ASSESSMENT — ENCOUNTER SYMPTOMS
DEPRESSION: 0
OCCASIONAL FEELINGS OF UNSTEADINESS: 0
LOSS OF SENSATION IN FEET: 0

## 2025-07-28 NOTE — LETTER
July 28, 2025     Gilma Partida MD  [address] [facility], [place], operated by [facility] [address]  [facility] [address]    Patient: Neo Mckee   YOB: 1945   Date of Visit: 7/28/2025       Dear Dr. Gilma Partida MD:    Thank you for referring Neo Mckee to me for evaluation. Below are my notes for this consultation.  If you have questions, please do not hesitate to call me. I look forward to following your patient along with you.       Sincerely,     Darryl Neely MD      CC: No Recipients  ______________________________________________________________________________________    Subjective  Patient ID: Neo Mckee is a 79 y.o. male who presents for Back Pain.  HPI    Patient presents with low back pain that radiates into the right leg to the lateral knee. Would like to repeat injection.    79-year-old male with lower back pain with lower extremity claudication symptoms it does extremely well with periodic L5-S1 interlaminar epidural steroid injections.  Does take Tylenol and continues to do his home exercise program.  Otherwise symptoms are fairly similar to previous evaluation.  He is not interested in back surgery at this time due to having significant improvement with these injections every 3 to 4 months.    3/10-today  6/10-at worst    Medications: Tylenol PRN    Injections: 3/19/25- Interlaminar RALEIGH L5/S1- at least 80% relief for 3 + months    Other: home exercises          Pain Disability Index  Family/Home Responsibilities:: 6  Recreation:: 7  Social Activity:: 3  Occupation:: 4  Sexual Behavior:: 3  Self Care:: 2  Life-Support Activities:: 1   Monitoring and compliance:    ORT:    PDUQ:    Office Agreement:      Review of Systems     Current Outpatient Medications   Medication Instructions   • aspirin 81 mg capsule 1 tablet, Daily   • atorvastatin (LIPITOR) 40 mg, oral, Daily   • dilTIAZem CD (CARDIZEM CD) 300 mg, oral, Daily   • esomeprazole (NEXIUM) 20 mg, oral, Daily before breakfast   • ezetimibe (ZETIA) 10  mg, oral, Daily   • fluticasone (Flonase) 50 mcg/actuation nasal spray 1 spray, Each Nostril, Daily   • fluticasone propion-salmeteroL (Advair Diskus) 250-50 mcg/dose diskus inhaler 1 puff, inhalation, 2 times daily RT, Rinse mouth with water after use to reduce aftertaste and incidence of candidiasis. Do not swallow.   • furosemide (LASIX) 10 mg, oral, Daily, As needed for swelling   • multivitamin tablet 1 tablet, Daily RT   • NON FORMULARY Daily   • NON FORMULARY Daily   • olmesartan (BENICAR) 40 mg, oral, Daily   • Ventolin HFA 90 mcg/actuation inhaler 2 puffs, inhalation, Every 4 hours PRN        Medical History[1]     Surgical History[2]     Family History[3]     RX Allergies[4]     No results found for this or any previous visit from the past 1000 days.      Objective    Vitals:    07/28/25 0936   BP: 154/88   Pulse: 76   Resp: 16   SpO2: 96%               Physical Exam    GENERAL EXAM  Vital Signs: Vital signs to include heart rate, respiration rate, blood pressure, and temperature were reviewed.  General Appearance:  Awake, alert, healthy appearing, well developed, No acute distress.  Head: Normocephalic without evidence of head injury.  Neck: The appearance of the neck was normal without swelling with a midline trachea.  Eyes: The eyelids and eyebrows exhibited no abnormalities.  Pupils were not pin-point.  Sclera was without icterus.  Lungs: Respiration rhythm and depth was normal.  Respiratory movements were normal without labored breathing.  Cardiovascular: No peripheral edema was present.    Neurological: Patient was oriented to time, place, and person.  Speech was normal.  Balance, gait, and stance were unremarkable.    Psychiatric: Appearance was normal with appropriate dress.  Mood was euthymic and affect was normal.  Skin: Affected regions were without ecchymosis or skin lesions.          Assessment/Plan  Diagnoses and all orders for this visit:  Spinal stenosis of lumbar region with neurogenic  claudication  -     FL pain management; Future  -     Epidural Steroid Injection; Future  Other orders  -     NPO Diet Except: Sips with meds; Effective now; Standing  -     Height and weight; Standing  -     Insert and maintain peripheral IV; Standing  -     Saline lock IV; Standing  -     diazePAM (Valium) tablet 5 mg  -     Adult diet Regular; Standing  -     Vital Signs; Standing  -     Neuro checks; Standing  -     Notify physician - Standard Parameters; Standing  -     Continue IV fluids ordered pre-procedure; Standing  -     Prior to Discharge O2 Weaning; Standing  -     Pulse oximetry, continuous; Standing  -     Discharge patient; Standing    79-year-old male with lower back pain with right lower extremity radicular/claudication symptoms.  He has done very well with greater than 80% pain relief for at least 3 months previous epidural steroid injections.    Medical necessity: The patient is presenting primarily with Lumbar pain and claudication symptoms.  The pain is constant and of moderate severity that interferes with activities of daily living and sleep. The patient failed conservative therapy to include Tylenol/NSAIDS and physical therapy/supervised home exercise program and continues to participate in their supervised home exercise program.  Lumbar spine MRI which I personally reviewed showed multilevel degenerative changes with varying degrees of central canal stenosis throughout his entire lumbar spine.  The patient has previously undergone a Lumbar Intralaminar Epidural Steroid Injection at L5-S1 greater than 50% pain relief for greater than 3 months, repeat steroid injections for greater than 12 months.  The patient continues to have pain causing significant degree of functional disability, continues to receive greater than 50% pain for at least 3 months from previous epidural steroid injections, has recurrence of pain in the same location relieved with previous epidural steroid injections, does  not desire surgery, the patient's primary care provider is aware of repeated procedures and continued steroid use, a copy of this note has been sent to the PCP,. Based on these results will plan to repeat the previous injection.  Will proceed with Lumbar Intralaminar Epidural Steroid Injection at L5-S1 with fluoroscopy.  We discussed the risks, benefits and alternatives to the procedure(s) and the patient would like to proceed.  This procedure is part of a comprehensive, multimodal treatment plan to facility physical therapy and a supervised home exercise program.     Plan:  - Continue home exercise program.  - Continue with Tylenol as needed and TENS unit as needed.  - L5-S1 interlaminar epidural steroid injection.       This note was generated with the aid of dictation software, there may be typos despite my attempts at proofreading.        [1]  Past Medical History:  Diagnosis Date   • Anxiety 2013   • Atherosclerotic heart disease of native coronary artery without angina pectoris 10/12/2022    Coronary artery disease   • Cancer (Multi)    • CHF (congestive heart failure) 1996   • Dysphagia, unspecified 02/25/2015    Food sticks on swallowing   • Encounter for screening for malignant neoplasm of colon 08/04/2020    Colon cancer screening   • Esophageal obstruction 09/28/2015    Esophageal stricture   • Essential (primary) hypertension 10/12/2022    Hypertension   • GERD (gastroesophageal reflux disease) 1996   • Hyperlipidemia, unspecified 10/12/2022    Hyperlipidemia   • Myocardial infarction (Multi) 1996   • Other conditions influencing health status 03/24/2022    History of cough   • Other fecal abnormalities 01/28/2021    Positive colorectal cancer screening using Cologuard test   • Other specified cardiac arrhythmias 08/12/2021    Fusion beats   • Pain in left foot 11/12/2015    Left foot pain   • Personal history of other diseases of the circulatory system 08/12/2021    History of paroxysmal  supraventricular tachycardia   • Personal history of other diseases of the digestive system 04/05/2018    History of esophageal reflux   • Personal history of other diseases of the respiratory system 03/24/2022    History of acute bronchitis   • Personal history of other diseases of the respiratory system 12/23/2015    History of acute bronchitis   • Personal history of other drug therapy 10/11/2018    History of influenza vaccination   • Personal history of other mental and behavioral disorders 10/11/2018    History of anxiety   • Personal history of other specified conditions 08/30/2021    History of bradycardia   • Personal history of pneumonia (recurrent) 04/28/2022    History of pneumonia   • Plantar fascial fibromatosis 01/28/2021    Plantar fasciitis of left foot   • Spinal stenosis of lumbar region with neurogenic claudication    • Ventricular tachycardia, unspecified (Multi) 08/12/2021    Ventricular tachycardia seen on cardiac monitor   [2]  Past Surgical History:  Procedure Laterality Date   • CORONARY ARTERY BYPASS GRAFT  10/11/2018    CABG   • OTHER SURGICAL HISTORY  02/20/2015    Prostatectomy Robotic-Assisted   [3]  No family history on file.  [4]  Allergies  Allergen Reactions   • Beta-Blockers (Beta-Adrenergic Blocking Agts) Anaphylaxis        [1]  Past Medical History:  Diagnosis Date   • Anxiety 2013   • Atherosclerotic heart disease of native coronary artery without angina pectoris 10/12/2022    Coronary artery disease   • Cancer (Multi)    • CHF (congestive heart failure) 1996   • Dysphagia, unspecified 02/25/2015    Food sticks on swallowing   • Encounter for screening for malignant neoplasm of colon 08/04/2020    Colon cancer screening   • Esophageal obstruction 09/28/2015    Esophageal stricture   • Essential (primary) hypertension 10/12/2022    Hypertension   • GERD (gastroesophageal reflux disease) 1996   • Hyperlipidemia, unspecified 10/12/2022    Hyperlipidemia   • Myocardial infarction  (Multi) 1996   • Other conditions influencing health status 03/24/2022    History of cough   • Other fecal abnormalities 01/28/2021    Positive colorectal cancer screening using Cologuard test   • Other specified cardiac arrhythmias 08/12/2021    Fusion beats   • Pain in left foot 11/12/2015    Left foot pain   • Personal history of other diseases of the circulatory system 08/12/2021    History of paroxysmal supraventricular tachycardia   • Personal history of other diseases of the digestive system 04/05/2018    History of esophageal reflux   • Personal history of other diseases of the respiratory system 03/24/2022    History of acute bronchitis   • Personal history of other diseases of the respiratory system 12/23/2015    History of acute bronchitis   • Personal history of other drug therapy 10/11/2018    History of influenza vaccination   • Personal history of other mental and behavioral disorders 10/11/2018    History of anxiety   • Personal history of other specified conditions 08/30/2021    History of bradycardia   • Personal history of pneumonia (recurrent) 04/28/2022    History of pneumonia   • Plantar fascial fibromatosis 01/28/2021    Plantar fasciitis of left foot   • Spinal stenosis of lumbar region with neurogenic claudication    • Ventricular tachycardia, unspecified (Multi) 08/12/2021    Ventricular tachycardia seen on cardiac monitor   [2]  Past Surgical History:  Procedure Laterality Date   • CORONARY ARTERY BYPASS GRAFT  10/11/2018    CABG   • OTHER SURGICAL HISTORY  02/20/2015    Prostatectomy Robotic-Assisted   [3]  No family history on file.  [4]  Allergies  Allergen Reactions   • Beta-Blockers (Beta-Adrenergic Blocking Agts) Anaphylaxis

## 2025-07-28 NOTE — PROGRESS NOTES
Subjective   Patient ID: Neo Mckee is a 79 y.o. male who presents for Back Pain.  HPI    Patient presents with low back pain that radiates into the right leg to the lateral knee. Would like to repeat injection.    79-year-old male with lower back pain with lower extremity claudication symptoms it does extremely well with periodic L5-S1 interlaminar epidural steroid injections.  Does take Tylenol and continues to do his home exercise program.  Otherwise symptoms are fairly similar to previous evaluation.  He is not interested in back surgery at this time due to having significant improvement with these injections every 3 to 4 months.    3/10-today  6/10-at worst    Medications: Tylenol PRN    Injections: 3/19/25- Interlaminar RALEIGH L5/S1- at least 80% relief for 3 + months    Other: home exercises          Pain Disability Index  Family/Home Responsibilities:: 6  Recreation:: 7  Social Activity:: 3  Occupation:: 4  Sexual Behavior:: 3  Self Care:: 2  Life-Support Activities:: 1   Monitoring and compliance:    ORT:    PDUQ:    Office Agreement:      Review of Systems     Current Outpatient Medications   Medication Instructions    aspirin 81 mg capsule 1 tablet, Daily    atorvastatin (LIPITOR) 40 mg, oral, Daily    dilTIAZem CD (CARDIZEM CD) 300 mg, oral, Daily    esomeprazole (NEXIUM) 20 mg, oral, Daily before breakfast    ezetimibe (ZETIA) 10 mg, oral, Daily    fluticasone (Flonase) 50 mcg/actuation nasal spray 1 spray, Each Nostril, Daily    fluticasone propion-salmeteroL (Advair Diskus) 250-50 mcg/dose diskus inhaler 1 puff, inhalation, 2 times daily RT, Rinse mouth with water after use to reduce aftertaste and incidence of candidiasis. Do not swallow.    furosemide (LASIX) 10 mg, oral, Daily, As needed for swelling    multivitamin tablet 1 tablet, Daily RT    NON FORMULARY Daily    NON FORMULARY Daily    olmesartan (BENICAR) 40 mg, oral, Daily    Ventolin HFA 90 mcg/actuation inhaler 2 puffs, inhalation, Every 4  hours PRN        Medical History[1]     Surgical History[2]     Family History[3]     RX Allergies[4]     No results found for this or any previous visit from the past 1000 days.      Objective     Vitals:    07/28/25 0936   BP: 154/88   Pulse: 76   Resp: 16   SpO2: 96%               Physical Exam    GENERAL EXAM  Vital Signs: Vital signs to include heart rate, respiration rate, blood pressure, and temperature were reviewed.  General Appearance:  Awake, alert, healthy appearing, well developed, No acute distress.  Head: Normocephalic without evidence of head injury.  Neck: The appearance of the neck was normal without swelling with a midline trachea.  Eyes: The eyelids and eyebrows exhibited no abnormalities.  Pupils were not pin-point.  Sclera was without icterus.  Lungs: Respiration rhythm and depth was normal.  Respiratory movements were normal without labored breathing.  Cardiovascular: No peripheral edema was present.    Neurological: Patient was oriented to time, place, and person.  Speech was normal.  Balance, gait, and stance were unremarkable.    Psychiatric: Appearance was normal with appropriate dress.  Mood was euthymic and affect was normal.  Skin: Affected regions were without ecchymosis or skin lesions.          Assessment/Plan   Diagnoses and all orders for this visit:  Spinal stenosis of lumbar region with neurogenic claudication  -     FL pain management; Future  -     Epidural Steroid Injection; Future  Other orders  -     NPO Diet Except: Sips with meds; Effective now; Standing  -     Height and weight; Standing  -     Insert and maintain peripheral IV; Standing  -     Saline lock IV; Standing  -     diazePAM (Valium) tablet 5 mg  -     Adult diet Regular; Standing  -     Vital Signs; Standing  -     Neuro checks; Standing  -     Notify physician - Standard Parameters; Standing  -     Continue IV fluids ordered pre-procedure; Standing  -     Prior to Discharge O2 Weaning; Standing  -     Pulse  oximetry, continuous; Standing  -     Discharge patient; Standing    79-year-old male with lower back pain with right lower extremity radicular/claudication symptoms.  He has done very well with greater than 80% pain relief for at least 3 months previous epidural steroid injections.    Medical necessity: The patient is presenting primarily with Lumbar pain and claudication symptoms.  The pain is constant and of moderate severity that interferes with activities of daily living and sleep. The patient failed conservative therapy to include Tylenol/NSAIDS and physical therapy/supervised home exercise program and continues to participate in their supervised home exercise program.  Lumbar spine MRI which I personally reviewed showed multilevel degenerative changes with varying degrees of central canal stenosis throughout his entire lumbar spine.  The patient has previously undergone a Lumbar Intralaminar Epidural Steroid Injection at L5-S1 greater than 50% pain relief for greater than 3 months, repeat steroid injections for greater than 12 months.  The patient continues to have pain causing significant degree of functional disability, continues to receive greater than 50% pain for at least 3 months from previous epidural steroid injections, has recurrence of pain in the same location relieved with previous epidural steroid injections, does not desire surgery, the patient's primary care provider is aware of repeated procedures and continued steroid use, a copy of this note has been sent to the PCP,. Based on these results will plan to repeat the previous injection.  Will proceed with Lumbar Intralaminar Epidural Steroid Injection at L5-S1 with fluoroscopy.  We discussed the risks, benefits and alternatives to the procedure(s) and the patient would like to proceed.  This procedure is part of a comprehensive, multimodal treatment plan to facility physical therapy and a supervised home exercise program.     Plan:  - Continue  home exercise program.  - Continue with Tylenol as needed and TENS unit as needed.  - L5-S1 interlaminar epidural steroid injection.       This note was generated with the aid of dictation software, there may be typos despite my attempts at proofreading.        [1]   Past Medical History:  Diagnosis Date    Anxiety 2013    Atherosclerotic heart disease of native coronary artery without angina pectoris 10/12/2022    Coronary artery disease    Cancer (Multi)     CHF (congestive heart failure) 1996    Dysphagia, unspecified 02/25/2015    Food sticks on swallowing    Encounter for screening for malignant neoplasm of colon 08/04/2020    Colon cancer screening    Esophageal obstruction 09/28/2015    Esophageal stricture    Essential (primary) hypertension 10/12/2022    Hypertension    GERD (gastroesophageal reflux disease) 1996    Hyperlipidemia, unspecified 10/12/2022    Hyperlipidemia    Myocardial infarction (Multi) 1996    Other conditions influencing health status 03/24/2022    History of cough    Other fecal abnormalities 01/28/2021    Positive colorectal cancer screening using Cologuard test    Other specified cardiac arrhythmias 08/12/2021    Fusion beats    Pain in left foot 11/12/2015    Left foot pain    Personal history of other diseases of the circulatory system 08/12/2021    History of paroxysmal supraventricular tachycardia    Personal history of other diseases of the digestive system 04/05/2018    History of esophageal reflux    Personal history of other diseases of the respiratory system 03/24/2022    History of acute bronchitis    Personal history of other diseases of the respiratory system 12/23/2015    History of acute bronchitis    Personal history of other drug therapy 10/11/2018    History of influenza vaccination    Personal history of other mental and behavioral disorders 10/11/2018    History of anxiety    Personal history of other specified conditions 08/30/2021    History of bradycardia     Personal history of pneumonia (recurrent) 04/28/2022    History of pneumonia    Plantar fascial fibromatosis 01/28/2021    Plantar fasciitis of left foot    Spinal stenosis of lumbar region with neurogenic claudication     Ventricular tachycardia, unspecified (Multi) 08/12/2021    Ventricular tachycardia seen on cardiac monitor   [2]   Past Surgical History:  Procedure Laterality Date    CORONARY ARTERY BYPASS GRAFT  10/11/2018    CABG    OTHER SURGICAL HISTORY  02/20/2015    Prostatectomy Robotic-Assisted   [3] No family history on file.  [4]   Allergies  Allergen Reactions    Beta-Blockers (Beta-Adrenergic Blocking Agts) Anaphylaxis

## 2025-08-08 ENCOUNTER — HOSPITAL ENCOUNTER (OUTPATIENT)
Dept: GASTROENTEROLOGY | Facility: HOSPITAL | Age: 80
Discharge: HOME | End: 2025-08-08
Payer: MEDICARE

## 2025-08-08 VITALS
BODY MASS INDEX: 27.35 KG/M2 | WEIGHT: 191 LBS | DIASTOLIC BLOOD PRESSURE: 76 MMHG | SYSTOLIC BLOOD PRESSURE: 138 MMHG | RESPIRATION RATE: 16 BRPM | HEART RATE: 76 BPM | TEMPERATURE: 98.5 F | OXYGEN SATURATION: 95 % | HEIGHT: 70 IN

## 2025-08-08 DIAGNOSIS — M48.062 SPINAL STENOSIS OF LUMBAR REGION WITH NEUROGENIC CLAUDICATION: ICD-10-CM

## 2025-08-08 PROCEDURE — 7100000010 HC PHASE TWO TIME - EACH INCREMENTAL 1 MINUTE

## 2025-08-08 PROCEDURE — 7100000009 HC PHASE TWO TIME - INITIAL BASE CHARGE

## 2025-08-08 PROCEDURE — 2550000001 HC RX 255 CONTRASTS: Performed by: PHYSICAL MEDICINE & REHABILITATION

## 2025-08-08 PROCEDURE — 2500000004 HC RX 250 GENERAL PHARMACY W/ HCPCS (ALT 636 FOR OP/ED): Performed by: PHYSICAL MEDICINE & REHABILITATION

## 2025-08-08 PROCEDURE — 62323 NJX INTERLAMINAR LMBR/SAC: CPT | Performed by: PHYSICAL MEDICINE & REHABILITATION

## 2025-08-08 RX ORDER — METHYLPREDNISOLONE ACETATE 40 MG/ML
INJECTION, SUSPENSION INTRA-ARTICULAR; INTRALESIONAL; INTRAMUSCULAR; SOFT TISSUE AS NEEDED
Status: COMPLETED | OUTPATIENT
Start: 2025-08-08 | End: 2025-08-08

## 2025-08-08 RX ORDER — LIDOCAINE HYDROCHLORIDE 5 MG/ML
INJECTION, SOLUTION INFILTRATION; INTRAVENOUS AS NEEDED
Status: COMPLETED | OUTPATIENT
Start: 2025-08-08 | End: 2025-08-08

## 2025-08-08 RX ORDER — DIAZEPAM 5 MG/1
5 TABLET ORAL ONCE AS NEEDED
Status: DISCONTINUED | OUTPATIENT
Start: 2025-08-08 | End: 2025-08-09 | Stop reason: HOSPADM

## 2025-08-08 RX ADMIN — METHYLPREDNISOLONE ACETATE 40 MG: 40 INJECTION, SUSPENSION INTRA-ARTICULAR; INTRALESIONAL; INTRAMUSCULAR; INTRASYNOVIAL; SOFT TISSUE at 11:37

## 2025-08-08 RX ADMIN — IOHEXOL 5 ML: 350 INJECTION, SOLUTION INTRAVENOUS at 11:36

## 2025-08-08 RX ADMIN — LIDOCAINE HYDROCHLORIDE 15 ML: 5 INJECTION, SOLUTION INFILTRATION at 11:36

## 2025-08-08 ASSESSMENT — PAIN SCALES - GENERAL
PAINLEVEL_OUTOF10: 0 - NO PAIN
PAINLEVEL_OUTOF10: 4
PAINLEVEL_OUTOF10: 0 - NO PAIN

## 2025-08-08 ASSESSMENT — PAIN - FUNCTIONAL ASSESSMENT
PAIN_FUNCTIONAL_ASSESSMENT: 0-10

## 2025-08-08 ASSESSMENT — PAIN DESCRIPTION - DESCRIPTORS: DESCRIPTORS: ACHING

## 2025-08-08 NOTE — DISCHARGE INSTRUCTIONS
DISCHARGE INSTRUCTIONS FOR INJECTIONS     After most injections, it is recommended that you relax and limit your activity for the remainder of the day unless you have been told otherwise by your pain physician.  You should not drive a car, operate machinery, or make important legal decisions unless otherwise directed by your pain physician.  You may resume your normal activity, including exercise, tomorrow.      Keep a written pain diary of how much pain relief you experienced following the injection procedure and the length of time of pain relief you experienced pain relief. Following diagnostic injections like medial branch nerve blocks, genicular nerve blocks, sacroiliac joint blocks, stellate ganglion injections and other blocks, it is very important you record the specific amount of pain relief you experienced immediately after the injection and how long it lasted. If you have been given Questionnaire paperwork to fill out out after your diagnostic block please call [phone] and leave a detailed voicemail with the answers to the questions you were given. This information is vital to getting approval for next steps.    Observe the needle site for excessive bleeding (slow general oozing that completely soaks the dressing or fresh bright red bleeding).  In either case, apply pressure to the area, elevate it if possible and call your doctor at once.      For all injections, please keep the injection site dry and inspect the site for a couple of days. You may remove the Band-Aid the day of the injection at any time.     Keep the needle site clean & dry for 24 hours.   no soaking baths, hot tubs, whirlpools or swimming pools for 2-3 days.     Some discomfort, bruising or slight swelling may occur at the injection site. This is not abnormal if it occurs.  If needed you may:    -Take over the counter medication such as Tylenol or Motrin.   -Apply an ice pack for 30 minutes, 2 to 3 times a day for the first 24  hours.     If you are given steroids in your injection, your pain may not be gone immediately after this procedure. It generally takes 3-5 days for the steroid to work but it can take up to 2 weeks. may You may notice a worsening of your symptoms for 1-2 days after the injection. This is not abnormal.  You may use acetaminophen, ibuprofen, or prescription medication that your doctor may have prescribed for you if you need to do so.     A few common side effects of steroids include facial flushing, sweating, restlessness, irritability,difficulty sleeping, increase in blood sugar, and increased blood pressure. If you have diabetes, please monitor your blood sugar at least once a day for at least 5 days. If you have poorly controlled high blood pressure, monitoryour blood pressure for at least 2 days and contact your primary care physician if these numbers are unusually high for you.        Call  Pain Management at [phone] between 8am-4pm Monday - Friday if you are experiencing the following:    If you received an epidural or spinal injection:    -Headache that does not go away with medicine, is worse when sitting or standing up, and is greatly relieved upon lying down.   -Severe pain worse than or different than your baseline pain.   -Chills or fever (101º F or greater).   -Drainage or signs of infection at the injection site     Go directly to the Emergency Department if you are experiencing the following and received an epidural or spinal injection:   -Abrupt weakness or progressive weakness in your legs that starts after you leave the clinic.   -Abrupt severe or worsening numbness in your legs.   -Inability to urinate after the injection or loss of bowel or bladder control without the urge to defecate or urinate.     If you have a clinical question that cannot wait until your next appointment, please call [phone] between 8am-4pm Monday - Friday. We do our best to return all non-emergency messages within  24-48 hours, Monday - Friday. A nurse or physician will return your message. You may also try calling and they will do their best to answer your question(s):    Gi Neely's nurse [phone]  [facility] Pain Management nurse [phone]  After hours pain management [phone]    If you need to cancel an appointment, please call the scheduling staff at [phone] during normal business hours or leave a message at least 24 hours in advance.

## 2025-08-08 NOTE — ADDENDUM NOTE
Encounter addended by: Fang Juares RN on: 8/8/2025 2:00 PM   Actions taken: Check Out activity completed

## 2025-09-10 ENCOUNTER — APPOINTMENT (OUTPATIENT)
Dept: PRIMARY CARE | Facility: CLINIC | Age: 80
End: 2025-09-10
Payer: MEDICARE